# Patient Record
Sex: MALE | Race: WHITE | NOT HISPANIC OR LATINO | Employment: UNEMPLOYED | URBAN - NONMETROPOLITAN AREA
[De-identification: names, ages, dates, MRNs, and addresses within clinical notes are randomized per-mention and may not be internally consistent; named-entity substitution may affect disease eponyms.]

---

## 2017-01-06 ENCOUNTER — OFFICE VISIT (OUTPATIENT)
Dept: MEDICAL GROUP | Facility: CLINIC | Age: 47
End: 2017-01-06
Payer: OTHER MISCELLANEOUS

## 2017-01-06 VITALS
SYSTOLIC BLOOD PRESSURE: 110 MMHG | TEMPERATURE: 99.3 F | OXYGEN SATURATION: 98 % | WEIGHT: 197 LBS | HEART RATE: 78 BPM | DIASTOLIC BLOOD PRESSURE: 70 MMHG | RESPIRATION RATE: 16 BRPM | BODY MASS INDEX: 25.28 KG/M2 | HEIGHT: 74 IN

## 2017-01-06 DIAGNOSIS — G89.29 CHRONIC NONINTRACTABLE HEADACHE, UNSPECIFIED HEADACHE TYPE: ICD-10-CM

## 2017-01-06 DIAGNOSIS — R51.9 CHRONIC NONINTRACTABLE HEADACHE, UNSPECIFIED HEADACHE TYPE: ICD-10-CM

## 2017-01-06 DIAGNOSIS — M54.50 MIDLINE LOW BACK PAIN WITHOUT SCIATICA, UNSPECIFIED CHRONICITY: ICD-10-CM

## 2017-01-06 PROCEDURE — 99214 OFFICE O/P EST MOD 30 MIN: CPT | Performed by: NURSE PRACTITIONER

## 2017-01-06 RX ORDER — HYDROCODONE BITARTRATE AND ACETAMINOPHEN 5; 325 MG/1; MG/1
1 TABLET ORAL 2 TIMES DAILY PRN
Qty: 60 TAB | Refills: 0 | Status: SHIPPED | OUTPATIENT
Start: 2017-01-06 | End: 2017-04-05 | Stop reason: SDUPTHER

## 2017-01-06 RX ORDER — HYDROCODONE BITARTRATE AND ACETAMINOPHEN 5; 325 MG/1; MG/1
1 TABLET ORAL 2 TIMES DAILY PRN
Qty: 60 TAB | Refills: 0 | Status: SHIPPED
Start: 2017-01-06 | End: 2017-01-06 | Stop reason: SDUPTHER

## 2017-01-06 RX ORDER — HYDROCODONE BITARTRATE AND ACETAMINOPHEN 5; 325 MG/1; MG/1
1 TABLET ORAL 2 TIMES DAILY PRN
Qty: 60 TAB | Refills: 0 | Status: SHIPPED | OUTPATIENT
Start: 2017-01-06 | End: 2017-04-05

## 2017-01-06 NOTE — MR AVS SNAPSHOT
"        Arcelia Umanzor   2017 4:00 PM   Office Visit   MRN: [id number]    Department:  [facility] Phone:  [phone]    Description:  Male : 1970   Provider:  FRANCO Henley           Reason for Visit     Medication Refill           Allergies as of 2017     No Known Allergies      You were diagnosed with     Chronic nonintractable headache, unspecified headache type   [5851829]       Midline low back pain without sciatica, unspecified chronicity   [[id number]]       Lower abdominal pain   [244777]   Medication refilled.      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   2015 - Present    Dyspepsia and disorder of function of stomach K31.9, R10.13   2015 - Present    Anxiety F41.9   2015 - Present    Right-sided low back pain without sciatica M54.5   2016 - Present    Anal fissure K60.2   3/28/2016 - Present    IBS (irritable bowel syndrome) K58.9   6/10/2016 - Present    Tension type headache G44.209   2016 - Present    Rectal pain K62.89   2016 - Present    Midline low back pain M54.5   2016 - Present    Chronic nonintractable headache R51   2017 - Present      Health Maintenance        Date Due Completion Dates    IMM INFLUENZA (1) 2016 10/13/2015, 10/15/2014, 10/24/2013    IMM DTaP/Tdap/Td Vaccine (2 - Td) 10/27/2023 10/27/2013, 2011            Current Immunizations     Hep A/HEP B Combined Vaccine (TwinRix) 10/31/2011    Influenza Vaccine Quad Inj (Pf) 10/24/2013    Influenza Vaccine Quad Inj (Preserved) " 10/13/2015, 10/15/2014    Tdap Vaccine 10/27/2013, 12/13/2011      Below and/or attached are the medications your provider expects you to take. Review all of your home medications and newly ordered medications with your provider and/or pharmacist. Follow medication instructions as directed by your provider and/or pharmacist. Please keep your medication list with you and share with your provider. Update the information when medications are discontinued, doses are changed, or new medications (including over-the-counter products) are added; and carry medication information at all times in the event of emergency situations     Allergies:  No Known Allergies          Medications  Valid as of: January 06, 2017 -  4:28 PM    Generic Name Brand Name Tablet Size Instructions for use    ALPRAZolam (Tab) XANAX 0.5 MG Take 1 Tab by mouth at bedtime as needed for Sleep.        Amitriptyline HCl   Take  by mouth.        Hydrocodone-Acetaminophen (Tab) NORCO 5-325 MG Take 1 Tab by mouth 2 times a day as needed.        Hydrocodone-Acetaminophen (Tab) NORCO 5-325 MG Take 1 Tab by mouth 2 times a day as needed.        Sucralfate (Tab) CARAFATE 1 GM Take 1 Tab by mouth 4 Times a Day,Before Meals and at Bedtime.        Venlafaxine HCl (CAPSULE SR 24 HR) EFFEXOR XR 37.5 MG Take 1 Cap by mouth every day.        .                 Medicines prescribed today were sent to:     [facility] [address] [place] [id number]    Phone: [phone] Fax: [phone]    Open 24 Hours?: No      Medication refill instructions:       If your prescription bottle indicates you have medication refills left, it is not necessary to call your provider’s office. Please contact your pharmacy and they will refill your medication.    If your prescription bottle indicates you do not have any refills left, you may request refills at any time through one of the following ways: The online [employer]  system (except Urgent Care), by calling your provider’s office, or by asking your pharmacy to contact your provider’s office with a refill request. Medication refills are processed only during regular business hours and may not be available until the next business day. Your provider may request additional information or to have a follow-up visit with you prior to refilling your medication.   *Please Note: Medication refills are assigned a new Rx number when refilled electronically. Your pharmacy may indicate that no refills were authorized even though a new prescription for the same medication is available at the pharmacy. Please request the medicine by name with the pharmacy before contacting your provider for a refill.        Referral     A referral request has been sent to our patient care coordination department. Please allow 3-5 business days for us to process this request and contact you either by phone or mail. If you do not hear from us by the 5th business day, please call us at [phone].           [employer] (Closed) Access Code: Activation code not generated  Current [employer] (Closed) Status: Active

## 2017-01-07 NOTE — ASSESSMENT & PLAN NOTE
Daily headaches  Often accompanied by back and neck pain  OTC medication doesn't work  Never used migraine meds
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
## 2017-04-05 ENCOUNTER — OFFICE VISIT (OUTPATIENT)
Dept: MEDICAL GROUP | Facility: CLINIC | Age: 47
End: 2017-04-05
Payer: OTHER MISCELLANEOUS

## 2017-04-05 VITALS
HEART RATE: 96 BPM | SYSTOLIC BLOOD PRESSURE: 144 MMHG | BODY MASS INDEX: 24.9 KG/M2 | DIASTOLIC BLOOD PRESSURE: 90 MMHG | HEIGHT: 74 IN | WEIGHT: 194 LBS | OXYGEN SATURATION: 97 % | TEMPERATURE: 99.1 F | RESPIRATION RATE: 16 BRPM

## 2017-04-05 DIAGNOSIS — Z13.6 SCREENING FOR CARDIOVASCULAR CONDITION: ICD-10-CM

## 2017-04-05 DIAGNOSIS — M54.50 MIDLINE LOW BACK PAIN WITHOUT SCIATICA, UNSPECIFIED CHRONICITY: ICD-10-CM

## 2017-04-05 DIAGNOSIS — M54.50 CHRONIC RIGHT-SIDED LOW BACK PAIN WITHOUT SCIATICA: ICD-10-CM

## 2017-04-05 DIAGNOSIS — F41.9 ANXIETY: ICD-10-CM

## 2017-04-05 DIAGNOSIS — G89.29 CHRONIC RIGHT-SIDED LOW BACK PAIN WITHOUT SCIATICA: ICD-10-CM

## 2017-04-05 PROCEDURE — 99213 OFFICE O/P EST LOW 20 MIN: CPT | Performed by: PHYSICIAN ASSISTANT

## 2017-04-05 RX ORDER — HYDROCODONE BITARTRATE AND ACETAMINOPHEN 5; 325 MG/1; MG/1
1 TABLET ORAL
Qty: 30 TAB | Refills: 0 | Status: SHIPPED | OUTPATIENT
Start: 2017-04-05 | End: 2017-05-04 | Stop reason: SDUPTHER

## 2017-04-05 RX ORDER — BUSPIRONE HYDROCHLORIDE 10 MG/1
10 TABLET ORAL 2 TIMES DAILY
Qty: 90 TAB | Refills: 0 | Status: SHIPPED | OUTPATIENT
Start: 2017-04-05 | End: 2018-11-08

## 2017-04-05 ASSESSMENT — PATIENT HEALTH QUESTIONNAIRE - PHQ9: CLINICAL INTERPRETATION OF PHQ2 SCORE: 0

## 2017-04-05 ASSESSMENT — PAIN SCALES - GENERAL: PAINLEVEL: 6=MODERATE PAIN
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
## 2017-04-05 NOTE — MR AVS SNAPSHOT
"        Arcelia Umanzor   2017 1:00 PM   Office Visit   MRN: [id number]    [facility] Phone:  [phone]    Description:  Male : 1970   Provider:  Almaz Richmond PA-C           Reason for Visit     Hand Pain left thumb injury x1 month     Medication Refill pain medication, anxiety       Allergies as of 2017     No Known Allergies      You were diagnosed with     Midline low back pain without sciatica, unspecified chronicity   [8049847]   Uncontrolled. Referral initiated to physical therapy and pain management. Use pain medication as directed and as needed for symptoms. Follow-up in 3 months.    Chronic right-sided low back pain without sciatica   [1548892]       Anxiety   [845930]       Screening for cardiovascular condition   [829523]         Vital Signs     Blood Pressure Pulse Temperature Respirations Height Weight    144/90 mmHg 96 37.3 °C (99.1 °F) 16 1.88 m (6' 2.02\") 87.998 kg (194 lb)    Body Mass Index Oxygen Saturation Smoking Status             24.90 kg/m2 97% Never Smoker          Basic Information     Date Of Birth Sex Race Ethnicity Preferred Language    1970 Male White Non- English      Your appointments     May 08, 2017  8:00 AM   Established Patient with Almaz Richmond PA-C   [facility] (--)    [address] [place] [address] [id number] [phone]           You will be receiving a confirmation call a few days before your appointment from our automated call confirmation system.              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   2015 - Present    Dyspepsia and disorder of function of stomach K31.9, R10.13   2015 - Present    Anxiety F41.9   2015 - Present    Right-sided low back pain without sciatica M54.5   2016 - Present    Anal fissure K60.2   3/28/2016 - Present    IBS (irritable bowel syndrome) K58.9   6/10/2016 - Present   " Tension type headache G44.209   7/12/2016 - Present    Rectal pain K62.89   7/12/2016 - Present    Midline low back pain M54.5   7/12/2016 - Present    Chronic nonintractable headache R51   1/6/2017 - Present      Health Maintenance        Date Due Completion Dates    IMM DTaP/Tdap/Td Vaccine (2 - Td) 10/27/2023 10/27/2013, 12/13/2011            Current Immunizations     Hep A/HEP B Combined Vaccine (TwinRix) 10/31/2011    Influenza Vaccine Quad Inj (Pf) 10/24/2013    Influenza Vaccine Quad Inj (Preserved) 10/13/2015, 10/15/2014    Tdap Vaccine 10/27/2013, 12/13/2011      Below and/or attached are the medications your provider expects you to take. Review all of your home medications and newly ordered medications with your provider and/or pharmacist. Follow medication instructions as directed by your provider and/or pharmacist. Please keep your medication list with you and share with your provider. Update the information when medications are discontinued, doses are changed, or new medications (including over-the-counter products) are added; and carry medication information at all times in the event of emergency situations     Allergies:  No Known Allergies          Medications  Valid as of: April 05, 2017 -  1:50 PM    Generic Name Brand Name Tablet Size Instructions for use    BusPIRone HCl (Tab) BUSPAR 10 MG Take 1 Tab by mouth 2 times a day.        Hydrocodone-Acetaminophen (Tab) NORCO 5-325 MG Take 1 Tab by mouth 1 time daily as needed.        .                 Medicines prescribed today were sent to:     [facility] [address] [place] [address] [address] [facility]    [address] [facility] [address]    Phone: [phone] Fax: [phone]    Open 24 Hours?: No      Medication refill instructions:       If your prescription bottle indicates you have medication refills left, it is not necessary to call your provider’s office. Please contact your pharmacy and they will refill your medication.    If  your prescription bottle indicates you do not have any refills left, you may request refills at any time through one of the following ways: The online [facility] system (except Urgent Care), by calling your provider’s office, or by asking your pharmacy to contact your provider’s office with a refill request. Medication refills are processed only during regular business hours and may not be available until the next business day. Your provider may request additional information or to have a follow-up visit with you prior to refilling your medication.   *Please Note: Medication refills are assigned a new Rx number when refilled electronically. Your pharmacy may indicate that no refills were authorized even though a new prescription for the same medication is available at the pharmacy. Please request the medicine by name with the pharmacy before contacting your provider for a refill.        Your To Do List     Future Labs/Procedures Complete By Expires    CBC WITHOUT DIFFERENTIAL  As directed 10/6/2017    [facility] PAIN MANAGEMENT SCREEN  As directed 4/5/2018    Comments:    Current Meds (name, sig, last dose):   Current outpatient prescriptions:   •  hydrocodone-acetaminophen, 1 Tab, Oral, BID PRN  •  alprazolam, 0.5 mg, Oral, HS PRN  •  hydrocodone-acetaminophen, 1 Tab, Oral, BID PRN  •  venlafaxine XR, 37.5 mg, Oral, DAILY (Patient not taking: Reported on 4/5/2017), not taking  •  sucralfate, 1 g, Oral, 4X/DAY ACHS (Patient not taking: Reported on 1/6/2017), not taking            Referral     A referral request has been sent to our patient care coordination department. Please allow 3-5 business days for us to process this request and contact you either by phone or mail. If you do not hear from us by the 5th business day, please call us at [phone].           [facility] Access Code: Activation code not generated  Current [facility] Status: Active          Quit Tobacco Information     Do you want to quit using  tobacco?    Quitting tobacco decreases risks of cancer, heart and lung disease, increases life expectancy, improves sense of taste and smell, and increases spending money, among other benefits.    If you are thinking about quitting, we can help.  • [facility] Quit Tobacco Program: [phone]  o Program occurs weekly for four weeks and includes pharmacist consultation on products to support quitting smoking or chewing tobacco. A provider referral is needed for pharmacist consultation.  • Tobacco Users Help Hotline: 3-146-QUIT-NOW (718-9257) or https://[place].quitlogix.org/  o Free, confidential telephone and online coaching for [place] residents. Sessions are designed on a schedule that is convenient for you. Eligible clients receive free nicotine replacement therapy.  • Nationally: www.smokefree.gov  o Information and professional assistance to support both immediate and long-term needs as you become, and remain, a non-smoker. Smokefree.gov allows you to choose the help that best fits your needs.

## 2017-04-05 NOTE — ASSESSMENT & PLAN NOTE
"Pt has anxiety that occurs with extremes every couple of weeks and at a low level on a daily basis. He did not like the side effects amitriptyline so he would like to try a different medication. Pt denies suicidal or homocidal ideations. Patient feels often he wishes to leave his workplace due to anxiety. He feels \"everyone gets on his nerves\". He will try daily BuSpar 10 mg tablets for the next month and return back to the clinic for checkup.  "

## 2017-05-04 DIAGNOSIS — M54.50 MIDLINE LOW BACK PAIN WITHOUT SCIATICA, UNSPECIFIED CHRONICITY: ICD-10-CM

## 2017-05-04 RX ORDER — HYDROCODONE BITARTRATE AND ACETAMINOPHEN 5; 325 MG/1; MG/1
0.5 TABLET ORAL
Qty: 30 TAB | Refills: 0 | Status: SHIPPED | OUTPATIENT
Start: 2017-05-04 | End: 2017-05-31 | Stop reason: SDUPTHER

## 2017-05-31 ENCOUNTER — OFFICE VISIT (OUTPATIENT)
Dept: MEDICAL GROUP | Facility: CLINIC | Age: 47
End: 2017-05-31
Payer: OTHER MISCELLANEOUS

## 2017-05-31 VITALS
BODY MASS INDEX: 24.38 KG/M2 | OXYGEN SATURATION: 97 % | WEIGHT: 190 LBS | TEMPERATURE: 98.7 F | HEIGHT: 74 IN | HEART RATE: 96 BPM | SYSTOLIC BLOOD PRESSURE: 130 MMHG | DIASTOLIC BLOOD PRESSURE: 74 MMHG

## 2017-05-31 DIAGNOSIS — M54.50 MIDLINE LOW BACK PAIN WITHOUT SCIATICA, UNSPECIFIED CHRONICITY: ICD-10-CM

## 2017-05-31 DIAGNOSIS — M54.50 CHRONIC RIGHT-SIDED LOW BACK PAIN WITHOUT SCIATICA: ICD-10-CM

## 2017-05-31 DIAGNOSIS — R68.89 FLU-LIKE SYMPTOMS: ICD-10-CM

## 2017-05-31 DIAGNOSIS — G89.29 CHRONIC RIGHT-SIDED LOW BACK PAIN WITHOUT SCIATICA: ICD-10-CM

## 2017-05-31 PROCEDURE — 99213 OFFICE O/P EST LOW 20 MIN: CPT | Performed by: PHYSICIAN ASSISTANT

## 2017-05-31 RX ORDER — OSELTAMIVIR PHOSPHATE 45 MG/1
45 CAPSULE ORAL EVERY 12 HOURS
Qty: 10 CAP | Refills: 0 | Status: SHIPPED | OUTPATIENT
Start: 2017-05-31 | End: 2018-11-08

## 2017-05-31 RX ORDER — HYDROCODONE BITARTRATE AND ACETAMINOPHEN 5; 325 MG/1; MG/1
1 TABLET ORAL
Qty: 13 TAB | Refills: 0 | Status: SHIPPED
Start: 2017-05-31 | End: 2018-11-08

## 2017-05-31 NOTE — Clinical Note
May 31, 2017       Patient: Arcelia Umanzor   YOB: 1970   Date of Visit: 5/31/2017         To Whom It May Concern:    It is my medical opinion that Arcelia Umanzor remain out of work until he is feeling better, likely no later than June 7th, 2017 .    If you have any questions or concerns, please don't hesitate to call [phone]          Sincerely,          Almaz Richmond PA-C  Electronically Signed
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
## 2017-05-31 NOTE — ASSESSMENT & PLAN NOTE
"PAtient has been having body aches and chills for approx 36 hours. He states he began feeling some lightheadedness last evening and this morning awoke with a splitting headache. He has had meningitis in the past but this does not feel the same. He is not having any changes inhis urine color and it is not cloudy although it does smell somewhat \"like wheaties\" today. He is having some mid-back pain without diarrhea or vomiting but with some nausea. He called in sick to work today and rarely does this as he is generally able to tough it out.   "

## 2018-01-22 NOTE — ASSESSMENT & PLAN NOTE
Patient has an appoitnment with [facility] for pain management on 6/16/17. He has run out of his Norco somewhat early as he sometimes took two per day instead of one per day as reccommended. He is currently having a bout of the flu but is requesting a bridge of his norco to help him with his back pain until he is seen by pain management.    33.7

## 2018-11-05 ENCOUNTER — NON-PROVIDER VISIT (OUTPATIENT)
Dept: URGENT CARE | Facility: PHYSICIAN GROUP | Age: 48
End: 2018-11-05

## 2018-11-05 DIAGNOSIS — Z02.1 PRE-EMPLOYMENT DRUG SCREENING: ICD-10-CM

## 2018-11-05 LAB
AMP AMPHETAMINE: NORMAL
BAR BARBITURATES: NORMAL
BZO BENZODIAZEPINES: NORMAL
COC COCAINE: NORMAL
INT CON NEG: NORMAL
INT CON POS: NORMAL
MDMA ECSTASY: NORMAL
MET METHAMPHETAMINES: NORMAL
MTD METHADONE: NORMAL
OPI OPIATES: NORMAL
OXY OXYCODONE: NORMAL
PCP PHENCYCLIDINE: NORMAL
POC URINE DRUG SCREEN OCDRS: NORMAL
THC: NORMAL

## 2018-11-05 PROCEDURE — 80305 DRUG TEST PRSMV DIR OPT OBS: CPT | Performed by: PHYSICIAN ASSISTANT

## 2018-11-08 ENCOUNTER — TELEPHONE (OUTPATIENT)
Dept: URGENT CARE | Facility: PHYSICIAN GROUP | Age: 48
End: 2018-11-08

## 2018-11-08 ENCOUNTER — APPOINTMENT (OUTPATIENT)
Dept: RADIOLOGY | Facility: IMAGING CENTER | Age: 48
End: 2018-11-08
Attending: PHYSICIAN ASSISTANT
Payer: MEDICAID

## 2018-11-08 ENCOUNTER — OCCUPATIONAL MEDICINE (OUTPATIENT)
Dept: URGENT CARE | Facility: PHYSICIAN GROUP | Age: 48
End: 2018-11-08
Payer: MEDICAID

## 2018-11-08 VITALS
SYSTOLIC BLOOD PRESSURE: 142 MMHG | BODY MASS INDEX: 23.74 KG/M2 | TEMPERATURE: 97 F | DIASTOLIC BLOOD PRESSURE: 90 MMHG | HEIGHT: 74 IN | RESPIRATION RATE: 18 BRPM | WEIGHT: 185 LBS | OXYGEN SATURATION: 97 % | HEART RATE: 98 BPM

## 2018-11-08 DIAGNOSIS — Z02.89 ENCOUNTER FOR PHYSICAL EXAMINATION RELATED TO EMPLOYMENT: ICD-10-CM

## 2018-11-08 PROCEDURE — 71045 X-RAY EXAM CHEST 1 VIEW: CPT | Mod: FY | Performed by: PHYSICIAN ASSISTANT

## 2018-11-08 PROCEDURE — 8915 PR COMPREHENSIVE PHYSICAL: Performed by: PHYSICIAN ASSISTANT

## 2018-11-08 PROCEDURE — 93000 ELECTROCARDIOGRAM COMPLETE: CPT | Performed by: PHYSICIAN ASSISTANT

## 2018-11-08 PROCEDURE — 94010 BREATHING CAPACITY TEST: CPT | Performed by: PHYSICIAN ASSISTANT

## 2018-11-08 PROCEDURE — 92553 AUDIOMETRY AIR & BONE: CPT | Performed by: PHYSICIAN ASSISTANT

## 2018-11-08 ASSESSMENT — ENCOUNTER SYMPTOMS
NAUSEA: 0
CHILLS: 0
CONSTIPATION: 0
DIARRHEA: 0
SHORTNESS OF BREATH: 0
FEVER: 0
COUGH: 0
ABDOMINAL PAIN: 0
VOMITING: 0
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
## 2018-11-21 ENCOUNTER — OFFICE VISIT (OUTPATIENT)
Dept: URGENT CARE | Facility: PHYSICIAN GROUP | Age: 48
End: 2018-11-21
Payer: MEDICAID

## 2018-11-21 VITALS
HEIGHT: 74 IN | TEMPERATURE: 98.6 F | BODY MASS INDEX: 25.21 KG/M2 | WEIGHT: 196.4 LBS | HEART RATE: 100 BPM | RESPIRATION RATE: 16 BRPM | SYSTOLIC BLOOD PRESSURE: 118 MMHG | DIASTOLIC BLOOD PRESSURE: 66 MMHG | OXYGEN SATURATION: 98 %

## 2018-11-21 DIAGNOSIS — J20.8 ACUTE BACTERIAL BRONCHITIS: ICD-10-CM

## 2018-11-21 DIAGNOSIS — B96.89 ACUTE BACTERIAL BRONCHITIS: ICD-10-CM

## 2018-11-21 PROCEDURE — 99214 OFFICE O/P EST MOD 30 MIN: CPT | Performed by: FAMILY MEDICINE

## 2018-11-21 RX ORDER — PREDNISONE 20 MG/1
40 TABLET ORAL EVERY MORNING
Qty: 10 TAB | Refills: 0 | Status: SHIPPED | OUTPATIENT
Start: 2018-11-21 | End: 2018-11-26

## 2018-11-21 RX ORDER — ALBUTEROL SULFATE 90 UG/1
2 AEROSOL, METERED RESPIRATORY (INHALATION) EVERY 6 HOURS PRN
Qty: 8.5 G | Refills: 3 | Status: SHIPPED | OUTPATIENT
Start: 2018-11-21 | End: 2019-01-19

## 2018-11-21 RX ORDER — DOXYCYCLINE HYCLATE 100 MG
100 TABLET ORAL EVERY 12 HOURS
Qty: 14 TAB | Refills: 0 | Status: SHIPPED | OUTPATIENT
Start: 2018-11-21 | End: 2018-11-28

## 2018-11-21 RX ORDER — PROMETHAZINE HYDROCHLORIDE AND CODEINE PHOSPHATE 6.25; 1 MG/5ML; MG/5ML
5 SYRUP ORAL 4 TIMES DAILY PRN
Qty: 140 ML | Refills: 0 | Status: SHIPPED | OUTPATIENT
Start: 2018-11-21 | End: 2018-11-28

## 2018-11-21 NOTE — PROGRESS NOTES
"Subjective:      Arcelia Umanzor is a 48 y.o. male who presents with Nasal Congestion (x 2 weeks ); Cough; Wheezing; and Fever      - This is a very pleasant, well and non-toxic appearing 48 y.o. male with complaints of 3 wks w/ cough wheezing. Remote smoker. Some inus and sore throat, but better now. No NVFC          ALLERGIES:  Patient has no known allergies.     PMH:  Past Medical History:   Diagnosis Date   • Anxiety    • Blood transfusion, without reported diagnosis    • Headache(784.0)    • Headache, classical migraine    • IBS (irritable bowel syndrome) 6/10/2016   • Meningitis 2010   • Substance abuse ([facility])         MEDS:    Current Outpatient Prescriptions:   •  Multiple Vitamins-Minerals (MULTI ADULT GUMMIES PO), Take  by mouth., Disp: , Rfl:   •  predniSONE (DELTASONE) 20 MG Tab, Take 2 Tabs by mouth every morning for 5 days., Disp: 10 Tab, Rfl: 0  •  albuterol (PROAIR HFA) 108 (90 Base) MCG/ACT Aero Soln inhalation aerosol, Inhale 2 Puffs by mouth every 6 hours as needed for Shortness of Breath., Disp: 8.5 g, Rfl: 3  •  promethazine-codeine (PHENERGAN-CODEINE) 6.25-10 MG/5ML Syrup, Take 5 mL by mouth 4 times a day as needed for Cough for up to 7 days., Disp: 140 mL, Rfl: 0  •  doxycycline (VIBRAMYCIN) 100 MG Tab, Take 1 Tab by mouth every 12 hours for 7 days., Disp: 14 Tab, Rfl: 0    ** I have documented what I find to be significant in regards to past medical, social, family and surgical history  in my HPI or under PMH/PSH/FH review section, otherwise it is contributory **           HPI    Review of Systems   All other systems reviewed and are negative.         Objective:     /66   Pulse 100   Temp 37 °C (98.6 °F)   Resp 16   Ht 1.88 m (6' 2\")   Wt 89.1 kg (196 lb 6.4 oz)   SpO2 98%   BMI 25.22 kg/m²      Physical Exam   Constitutional: He appears well-developed. No distress.   HENT:   Head: Normocephalic and atraumatic.   Mouth/Throat: Oropharynx is clear and moist.   Eyes: Conjunctivae are " normal.   Neck: Neck supple.   Cardiovascular: Regular rhythm.    No murmur heard.  Pulmonary/Chest: Effort normal and breath sounds normal. No respiratory distress.   Neurological: He is alert. He exhibits normal muscle tone.   Skin: Skin is warm and dry.   Psychiatric: He has a normal mood and affect. Judgment normal.   Nursing note and vitals reviewed.              Assessment/Plan:         1. Acute bacterial bronchitis  predniSONE (DELTASONE) 20 MG Tab    albuterol (PROAIR HFA) 108 (90 Base) MCG/ACT Aero Soln inhalation aerosol    promethazine-codeine (PHENERGAN-CODEINE) 6.25-10 MG/5ML Syrup    doxycycline (VIBRAMYCIN) 100 MG Tab             Dx & d/c instructions discussed w/ patient and/or family members.     ER precautions (worsening signs symptoms and when to go to ER) discussed.    Follow up w/ PCP in 2-3 days to make sure symptoms improving and no further intervention/treatment and/or work-up needed was advised, ER if feeling worse or not improving in 2 days.    Possible side effects (i.e. Rash, GI upset/constipation, sedation, elevation of BP or sugars) of any medications given discussed.     Patient left in stable condition

## 2019-01-19 ENCOUNTER — HOSPITAL ENCOUNTER (EMERGENCY)
Facility: MEDICAL CENTER | Age: 49
End: 2019-01-19
Attending: EMERGENCY MEDICINE
Payer: MEDICAID

## 2019-01-19 VITALS
HEART RATE: 109 BPM | RESPIRATION RATE: 14 BRPM | WEIGHT: 195.33 LBS | SYSTOLIC BLOOD PRESSURE: 149 MMHG | DIASTOLIC BLOOD PRESSURE: 112 MMHG | BODY MASS INDEX: 25.08 KG/M2 | OXYGEN SATURATION: 95 % | TEMPERATURE: 98.4 F

## 2019-01-19 DIAGNOSIS — J02.9 PHARYNGITIS, UNSPECIFIED ETIOLOGY: ICD-10-CM

## 2019-01-19 DIAGNOSIS — R05.9 COUGH: ICD-10-CM

## 2019-01-19 PROCEDURE — 99283 EMERGENCY DEPT VISIT LOW MDM: CPT

## 2019-01-19 RX ORDER — BENZONATATE 100 MG/1
100 CAPSULE ORAL 3 TIMES DAILY PRN
Qty: 15 CAP | Refills: 0 | Status: SHIPPED | OUTPATIENT
Start: 2019-01-19 | End: 2019-02-01

## 2019-01-19 RX ORDER — AMOXICILLIN AND CLAVULANATE POTASSIUM 875; 125 MG/1; MG/1
1 TABLET, FILM COATED ORAL 2 TIMES DAILY
Qty: 20 TAB | Refills: 0 | Status: SHIPPED | OUTPATIENT
Start: 2019-01-19 | End: 2019-01-29

## 2019-01-19 ASSESSMENT — LIFESTYLE VARIABLES: DO YOU DRINK ALCOHOL: NO
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
## 2019-01-19 NOTE — ED TRIAGE NOTES
Ambulates to triage  Chief Complaint   Patient presents with   • Cough   • Shortness of Breath   • Sore Throat     Sx for the last 4 days, 95% RA.   Cough is non productive.

## 2019-01-19 NOTE — ED NOTES
Pt ambulatory with nad and steady gait, resp even and non labored, pt verbalized understanding of poc an ddischarge

## 2019-02-01 ENCOUNTER — APPOINTMENT (OUTPATIENT)
Dept: RADIOLOGY | Facility: IMAGING CENTER | Age: 49
End: 2019-02-01
Attending: FAMILY MEDICINE
Payer: MEDICAID

## 2019-02-01 ENCOUNTER — OFFICE VISIT (OUTPATIENT)
Dept: URGENT CARE | Facility: PHYSICIAN GROUP | Age: 49
End: 2019-02-01
Payer: MEDICAID

## 2019-02-01 VITALS
DIASTOLIC BLOOD PRESSURE: 82 MMHG | RESPIRATION RATE: 18 BRPM | TEMPERATURE: 98.4 F | HEART RATE: 104 BPM | BODY MASS INDEX: 26.68 KG/M2 | OXYGEN SATURATION: 97 % | HEIGHT: 72 IN | SYSTOLIC BLOOD PRESSURE: 128 MMHG | WEIGHT: 197 LBS

## 2019-02-01 DIAGNOSIS — S99.912A INJURY OF LEFT ANKLE, INITIAL ENCOUNTER: ICD-10-CM

## 2019-02-01 DIAGNOSIS — R51.9 NONINTRACTABLE HEADACHE, UNSPECIFIED CHRONICITY PATTERN, UNSPECIFIED HEADACHE TYPE: ICD-10-CM

## 2019-02-01 DIAGNOSIS — S20.212A CONTUSION OF RIB ON LEFT SIDE, INITIAL ENCOUNTER: ICD-10-CM

## 2019-02-01 PROCEDURE — 73610 X-RAY EXAM OF ANKLE: CPT | Mod: LT | Performed by: FAMILY MEDICINE

## 2019-02-01 PROCEDURE — 99214 OFFICE O/P EST MOD 30 MIN: CPT | Performed by: FAMILY MEDICINE

## 2019-02-01 PROCEDURE — 71101 X-RAY EXAM UNILAT RIBS/CHEST: CPT | Mod: LT | Performed by: FAMILY MEDICINE

## 2019-02-01 RX ORDER — IBUPROFEN 200 MG
600 TABLET ORAL ONCE
Status: COMPLETED | OUTPATIENT
Start: 2019-02-01 | End: 2019-02-01

## 2019-02-01 RX ADMIN — Medication 600 MG: at 18:55
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
## 2019-05-18 ENCOUNTER — OFFICE VISIT (OUTPATIENT)
Dept: URGENT CARE | Facility: PHYSICIAN GROUP | Age: 49
End: 2019-05-18
Payer: MEDICAID

## 2019-05-18 VITALS
WEIGHT: 189 LBS | OXYGEN SATURATION: 98 % | TEMPERATURE: 98.2 F | SYSTOLIC BLOOD PRESSURE: 110 MMHG | HEART RATE: 89 BPM | DIASTOLIC BLOOD PRESSURE: 80 MMHG | HEIGHT: 74 IN | BODY MASS INDEX: 24.26 KG/M2 | RESPIRATION RATE: 16 BRPM

## 2019-05-18 DIAGNOSIS — M25.461 PAIN AND SWELLING OF RIGHT KNEE: ICD-10-CM

## 2019-05-18 DIAGNOSIS — M25.561 PAIN AND SWELLING OF RIGHT KNEE: ICD-10-CM

## 2019-05-18 PROCEDURE — 99214 OFFICE O/P EST MOD 30 MIN: CPT | Performed by: FAMILY MEDICINE

## 2019-05-18 RX ORDER — PREDNISONE 20 MG/1
TABLET ORAL
Qty: 9 TAB | Refills: 0 | Status: SHIPPED | OUTPATIENT
Start: 2019-05-18 | End: 2020-09-23

## 2019-05-18 RX ORDER — KETOROLAC TROMETHAMINE 30 MG/ML
60 INJECTION, SOLUTION INTRAMUSCULAR; INTRAVENOUS ONCE
Status: COMPLETED | OUTPATIENT
Start: 2019-05-18 | End: 2019-05-18

## 2019-05-18 RX ADMIN — KETOROLAC TROMETHAMINE 60 MG: 30 INJECTION, SOLUTION INTRAMUSCULAR; INTRAVENOUS at 12:07
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
## 2020-02-10 ENCOUNTER — OCCUPATIONAL MEDICINE (OUTPATIENT)
Dept: URGENT CARE | Facility: PHYSICIAN GROUP | Age: 50
End: 2020-02-10

## 2020-02-10 VITALS
HEART RATE: 73 BPM | OXYGEN SATURATION: 98 % | HEIGHT: 74 IN | SYSTOLIC BLOOD PRESSURE: 120 MMHG | BODY MASS INDEX: 26.31 KG/M2 | WEIGHT: 205 LBS | DIASTOLIC BLOOD PRESSURE: 70 MMHG | TEMPERATURE: 98 F | RESPIRATION RATE: 20 BRPM

## 2020-02-10 DIAGNOSIS — Z02.1 PRE-EMPLOYMENT DRUG SCREENING: ICD-10-CM

## 2020-02-10 DIAGNOSIS — Z02.89 EXAMINATION, PHYSICAL, EMPLOYEE: ICD-10-CM

## 2020-02-10 LAB
AMP AMPHETAMINE: NORMAL
COC COCAINE: NORMAL
INT CON NEG: NORMAL
INT CON POS: NORMAL
MET METHAMPHETAMINES: NORMAL
OPI OPIATES: NORMAL
PCP PHENCYCLIDINE: NORMAL
POC DRUG COMMENT 753798-OCCUPATIONAL HEALTH: NEGATIVE
THC: NORMAL

## 2020-02-10 PROCEDURE — 8915 PR COMPREHENSIVE PHYSICAL: Performed by: PHYSICIAN ASSISTANT

## 2020-02-10 PROCEDURE — 80305 DRUG TEST PRSMV DIR OPT OBS: CPT | Performed by: PHYSICIAN ASSISTANT

## 2020-09-03 ENCOUNTER — TELEPHONE (OUTPATIENT)
Dept: SCHEDULING | Facility: IMAGING CENTER | Age: 50
End: 2020-09-03

## 2020-09-23 ENCOUNTER — OFFICE VISIT (OUTPATIENT)
Dept: MEDICAL GROUP | Facility: CLINIC | Age: 50
End: 2020-09-23
Payer: COMMERCIAL

## 2020-09-23 VITALS
RESPIRATION RATE: 16 BRPM | TEMPERATURE: 98.3 F | WEIGHT: 203 LBS | SYSTOLIC BLOOD PRESSURE: 118 MMHG | HEIGHT: 74 IN | OXYGEN SATURATION: 100 % | HEART RATE: 76 BPM | BODY MASS INDEX: 26.05 KG/M2 | DIASTOLIC BLOOD PRESSURE: 82 MMHG

## 2020-09-23 DIAGNOSIS — M54.50 CHRONIC RIGHT-SIDED LOW BACK PAIN WITHOUT SCIATICA: ICD-10-CM

## 2020-09-23 DIAGNOSIS — K76.0 FATTY LIVER: ICD-10-CM

## 2020-09-23 DIAGNOSIS — K60.2 ANAL FISSURE: ICD-10-CM

## 2020-09-23 DIAGNOSIS — G89.29 CHRONIC RIGHT-SIDED LOW BACK PAIN WITHOUT SCIATICA: ICD-10-CM

## 2020-09-23 DIAGNOSIS — I49.9 IRREGULAR HEART BEAT: ICD-10-CM

## 2020-09-23 DIAGNOSIS — K27.9 PEPTIC ULCER: ICD-10-CM

## 2020-09-23 DIAGNOSIS — F17.220 CHEWING TOBACCO NICOTINE DEPENDENCE WITHOUT COMPLICATION: ICD-10-CM

## 2020-09-23 DIAGNOSIS — F41.9 ANXIETY: ICD-10-CM

## 2020-09-23 DIAGNOSIS — F32.0 CURRENT MILD EPISODE OF MAJOR DEPRESSIVE DISORDER WITHOUT PRIOR EPISODE (HCC): ICD-10-CM

## 2020-09-23 DIAGNOSIS — K58.2 IRRITABLE BOWEL SYNDROME WITH BOTH CONSTIPATION AND DIARRHEA: ICD-10-CM

## 2020-09-23 DIAGNOSIS — M79.5 RETAINED BULLET: ICD-10-CM

## 2020-09-23 DIAGNOSIS — M54.50 MIDLINE LOW BACK PAIN WITHOUT SCIATICA, UNSPECIFIED CHRONICITY: ICD-10-CM

## 2020-09-23 PROCEDURE — 99214 OFFICE O/P EST MOD 30 MIN: CPT | Performed by: PHYSICIAN ASSISTANT

## 2020-09-23 RX ORDER — PANTOPRAZOLE SODIUM 40 MG/1
TABLET, DELAYED RELEASE ORAL
Qty: 90 TAB | Refills: 2 | Status: SHIPPED | OUTPATIENT
Start: 2020-09-23 | End: 2020-11-17

## 2020-09-23 RX ORDER — PANTOPRAZOLE SODIUM 40 MG/1
TABLET, DELAYED RELEASE ORAL
COMMUNITY
Start: 2020-09-16 | End: 2020-09-23 | Stop reason: SDUPTHER

## 2020-09-23 RX ORDER — BUPROPION HYDROCHLORIDE 100 MG/1
100 TABLET, EXTENDED RELEASE ORAL 2 TIMES DAILY
Qty: 60 TAB | Refills: 3 | Status: SHIPPED | OUTPATIENT
Start: 2020-09-23 | End: 2020-11-17
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
## 2020-10-31 ENCOUNTER — HOSPITAL ENCOUNTER (OUTPATIENT)
Dept: LAB | Facility: MEDICAL CENTER | Age: 50
End: 2020-10-31
Attending: PHYSICIAN ASSISTANT
Payer: COMMERCIAL

## 2020-10-31 DIAGNOSIS — K58.2 IRRITABLE BOWEL SYNDROME WITH BOTH CONSTIPATION AND DIARRHEA: ICD-10-CM

## 2020-10-31 DIAGNOSIS — K27.9 PEPTIC ULCER: ICD-10-CM

## 2020-10-31 LAB
ALBUMIN SERPL BCP-MCNC: 4.6 G/DL (ref 3.2–4.9)
ALBUMIN/GLOB SERPL: 2.1 G/DL
ALP SERPL-CCNC: 77 U/L (ref 30–99)
ALT SERPL-CCNC: 19 U/L (ref 2–50)
ANION GAP SERPL CALC-SCNC: 12 MMOL/L (ref 7–16)
AST SERPL-CCNC: 20 U/L (ref 12–45)
BASOPHILS # BLD AUTO: 1.1 % (ref 0–1.8)
BASOPHILS # BLD: 0.07 K/UL (ref 0–0.12)
BILIRUB SERPL-MCNC: 0.5 MG/DL (ref 0.1–1.5)
BUN SERPL-MCNC: 21 MG/DL (ref 8–22)
CALCIUM SERPL-MCNC: 9.5 MG/DL (ref 8.5–10.5)
CHLORIDE SERPL-SCNC: 103 MMOL/L (ref 96–112)
CO2 SERPL-SCNC: 25 MMOL/L (ref 20–33)
CREAT SERPL-MCNC: 1.08 MG/DL (ref 0.5–1.4)
EOSINOPHIL # BLD AUTO: 0.09 K/UL (ref 0–0.51)
EOSINOPHIL NFR BLD: 1.5 % (ref 0–6.9)
ERYTHROCYTE [DISTWIDTH] IN BLOOD BY AUTOMATED COUNT: 40.3 FL (ref 35.9–50)
GLOBULIN SER CALC-MCNC: 2.2 G/DL (ref 1.9–3.5)
GLUCOSE SERPL-MCNC: 99 MG/DL (ref 65–99)
HCT VFR BLD AUTO: 44.8 % (ref 42–52)
HGB BLD-MCNC: 14.9 G/DL (ref 14–18)
IMM GRANULOCYTES # BLD AUTO: 0.01 K/UL (ref 0–0.11)
IMM GRANULOCYTES NFR BLD AUTO: 0.2 % (ref 0–0.9)
LYMPHOCYTES # BLD AUTO: 2.13 K/UL (ref 1–4.8)
LYMPHOCYTES NFR BLD: 34.6 % (ref 22–41)
MCH RBC QN AUTO: 30.3 PG (ref 27–33)
MCHC RBC AUTO-ENTMCNC: 33.3 G/DL (ref 33.7–35.3)
MCV RBC AUTO: 91.2 FL (ref 81.4–97.8)
MONOCYTES # BLD AUTO: 0.43 K/UL (ref 0–0.85)
MONOCYTES NFR BLD AUTO: 7 % (ref 0–13.4)
NEUTROPHILS # BLD AUTO: 3.43 K/UL (ref 1.82–7.42)
NEUTROPHILS NFR BLD: 55.6 % (ref 44–72)
NRBC # BLD AUTO: 0 K/UL
NRBC BLD-RTO: 0 /100 WBC
PLATELET # BLD AUTO: 261 K/UL (ref 164–446)
PMV BLD AUTO: 10.9 FL (ref 9–12.9)
POTASSIUM SERPL-SCNC: 4.7 MMOL/L (ref 3.6–5.5)
PROT SERPL-MCNC: 6.8 G/DL (ref 6–8.2)
RBC # BLD AUTO: 4.91 M/UL (ref 4.7–6.1)
SODIUM SERPL-SCNC: 140 MMOL/L (ref 135–145)
WBC # BLD AUTO: 6.2 K/UL (ref 4.8–10.8)

## 2020-10-31 PROCEDURE — 80053 COMPREHEN METABOLIC PANEL: CPT

## 2020-10-31 PROCEDURE — 85025 COMPLETE CBC W/AUTO DIFF WBC: CPT

## 2020-10-31 PROCEDURE — 36415 COLL VENOUS BLD VENIPUNCTURE: CPT

## 2020-11-12 ENCOUNTER — PATIENT MESSAGE (OUTPATIENT)
Dept: MEDICAL GROUP | Facility: CLINIC | Age: 50
End: 2020-11-12

## 2020-11-12 DIAGNOSIS — R10.13 DYSPEPSIA AND DISORDER OF FUNCTION OF STOMACH: ICD-10-CM

## 2020-11-12 DIAGNOSIS — K27.9 PEPTIC ULCER: ICD-10-CM

## 2020-11-12 DIAGNOSIS — K31.9 DYSPEPSIA AND DISORDER OF FUNCTION OF STOMACH: ICD-10-CM

## 2020-11-17 ENCOUNTER — TELEPHONE (OUTPATIENT)
Dept: MEDICAL GROUP | Facility: CLINIC | Age: 50
End: 2020-11-17

## 2020-11-17 ENCOUNTER — OFFICE VISIT (OUTPATIENT)
Dept: MEDICAL GROUP | Facility: CLINIC | Age: 50
End: 2020-11-17
Payer: COMMERCIAL

## 2020-11-17 VITALS
RESPIRATION RATE: 16 BRPM | OXYGEN SATURATION: 97 % | WEIGHT: 194 LBS | TEMPERATURE: 99 F | BODY MASS INDEX: 24.9 KG/M2 | DIASTOLIC BLOOD PRESSURE: 90 MMHG | HEIGHT: 74 IN | SYSTOLIC BLOOD PRESSURE: 142 MMHG | HEART RATE: 121 BPM

## 2020-11-17 DIAGNOSIS — R10.84 GENERALIZED ABDOMINAL PAIN: ICD-10-CM

## 2020-11-17 PROCEDURE — 99213 OFFICE O/P EST LOW 20 MIN: CPT | Performed by: PHYSICIAN ASSISTANT

## 2020-11-17 ASSESSMENT — PATIENT HEALTH QUESTIONNAIRE - PHQ9
5. POOR APPETITE OR OVEREATING: SEVERAL DAYS
3. TROUBLE FALLING OR STAYING ASLEEP OR SLEEPING TOO MUCH: NOT AT ALL
6. FEELING BAD ABOUT YOURSELF - OR THAT YOU ARE A FAILURE OR HAVE LET YOURSELF OR YOUR FAMILY DOWN: NOT AL ALL
2. FEELING DOWN, DEPRESSED, IRRITABLE, OR HOPELESS: SEVERAL DAYS
9. THOUGHTS THAT YOU WOULD BE BETTER OFF DEAD, OR OF HURTING YOURSELF: NOT AT ALL
7. TROUBLE CONCENTRATING ON THINGS, SUCH AS READING THE NEWSPAPER OR WATCHING TELEVISION: NOT AT ALL
SUM OF ALL RESPONSES TO PHQ9 QUESTIONS 1 AND 2: 1
1. LITTLE INTEREST OR PLEASURE IN DOING THINGS: NOT AT ALL
4. FEELING TIRED OR HAVING LITTLE ENERGY: SEVERAL DAYS
SUM OF ALL RESPONSES TO PHQ QUESTIONS 1-9: 3
8. MOVING OR SPEAKING SO SLOWLY THAT OTHER PEOPLE COULD HAVE NOTICED. OR THE OPPOSITE, BEING SO FIGETY OR RESTLESS THAT YOU HAVE BEEN MOVING AROUND A LOT MORE THAN USUAL: NOT AT ALL

## 2020-11-17 ASSESSMENT — FIBROSIS 4 INDEX: FIB4 SCORE: 0.84

## 2020-11-17 NOTE — LETTER
November 17, 2020       Patient: Arcelia Umanzor   YOB: 1970   Date of Visit: 11/17/2020         To Whom It May Concern:    In my medical opinion, I recommend that Arcelia Umanzor return to light duty with the following restrictions Patient is not to lift anything greater than 5 lbs for the next 2 weeks.    If you have any questions or concerns, please don't hesitate to call [phone]          Sincerely,          Mini Lazar P.A.-C.  Electronically Signed

## 2020-11-18 NOTE — PROGRESS NOTES
"cc:  Follow up    Subjective:     Arcelia Umanzor is a 50 y.o. male presenting for follow up      Patient presents to the office for follow up.  Patient states that the dycyclomine helps but after starting medication he had significant symptoms.  He had severe pain in the abdomen which lasted 90 minutes.  He states that he had a fast heart rate with the medication.  He has been having some lucid dreams.  However, it does help with the bloating. He is scheduled with GI on the 25th of this month.  He stopped the protonix as he felt it caused more bloating.  Patient was having symptoms with the dicyclomine and became irritable at work.  He states that his boss is now needing a note in order for him to return to work.  He states that he felt a pop while he was working and states that his symptoms started soon after.  He states that the more physical his day, the worse his stomach feels.     He states that the wellbutrin did not help.  He states that it did not help the cravings at all.       Review of systems:  See above.   Denies any symptoms unless previously indicated.      No current outpatient medications on file.    Allergies, past medical history, past surgical history, family history, social history reviewed and updated    Objective:     Vitals: /90 (BP Location: Left arm, Patient Position: Sitting, BP Cuff Size: Large adult)   Pulse (!) 121   Temp 37.2 °C (99 °F) (Temporal)   Resp 16   Ht 1.88 m (6' 2\") Comment: obtained from pt chart  Wt 88 kg (194 lb) Comment: with shoes on  SpO2 97%   BMI 24.91 kg/m²   General: Alert, pleasant, NAD  EYES:   PERRL, EOMI, no icterus or pallor.  Conjunctivae and lids normal.   HENT:  Normocephalic.  External ears normal.  Neck supple.   Respiratory: Normal respiratory effort.   Abdomen: Vertical surgical scar mid abdomen.  There is a bulging mass on the lower left quadrant near the surgical incision that is somewhat reducible.  There is some mild pain with " palpation.  Skin: Warm, dry, no rashes.  Musculoskeletal: Gait is normal.  Moves all extremities well.    Extremities: Range of motion all extremities.   Neurological: No tremors, sensation grossly intact,  CN2-12 intact.  Psych:  Affect/mood is normal, judgement is good, memory is intact, grooming is appropriate.    Assessment/Plan:     Arcelia was seen today for medication management.    Diagnoses and all orders for this visit:    Generalized abdominal pain  -     CT-ABDOMEN-PELVIS WITH & W/O; Future    It does appear patient has a lower right quadrant ventral hernia.  We will obtain a CT scan to evaluate further and if needed will refer to general surgery when we follow-up with test results.  In the meantime we will keep him on light duty to limit the potential for reinjury or worsening of symptoms.        Return in about 2 weeks (around 12/1/2020), or if symptoms worsen or fail to improve.    Please note that this dictation was created using voice recognition software. I have made every reasonable attempt to correct obvious errors, but expect that there are errors of grammar and possible content that I did not discover before finalizing note.

## 2020-12-19 ENCOUNTER — HOSPITAL ENCOUNTER (OUTPATIENT)
Dept: RADIOLOGY | Facility: MEDICAL CENTER | Age: 50
End: 2020-12-19
Attending: PHYSICIAN ASSISTANT
Payer: COMMERCIAL

## 2020-12-19 DIAGNOSIS — R10.84 GENERALIZED ABDOMINAL PAIN: ICD-10-CM

## 2020-12-19 PROCEDURE — 74177 CT ABD & PELVIS W/CONTRAST: CPT

## 2020-12-19 PROCEDURE — 700117 HCHG RX CONTRAST REV CODE 255: Performed by: PHYSICIAN ASSISTANT

## 2020-12-19 RX ADMIN — IOHEXOL 25 ML: 240 INJECTION, SOLUTION INTRATHECAL; INTRAVASCULAR; INTRAVENOUS; ORAL at 15:38

## 2020-12-19 RX ADMIN — IOHEXOL 100 ML: 350 INJECTION, SOLUTION INTRAVENOUS at 15:37

## 2020-12-28 ENCOUNTER — PATIENT MESSAGE (OUTPATIENT)
Dept: MEDICAL GROUP | Facility: CLINIC | Age: 50
End: 2020-12-28

## 2020-12-28 DIAGNOSIS — K27.9 PEPTIC ULCER: ICD-10-CM

## 2020-12-29 RX ORDER — OMEPRAZOLE 20 MG/1
20 CAPSULE, DELAYED RELEASE ORAL DAILY
Qty: 30 CAP | Refills: 1 | Status: SHIPPED | OUTPATIENT
Start: 2020-12-29 | End: 2021-05-04

## 2021-01-12 ENCOUNTER — OFFICE VISIT (OUTPATIENT)
Dept: URGENT CARE | Facility: PHYSICIAN GROUP | Age: 51
End: 2021-01-12
Payer: COMMERCIAL

## 2021-01-12 ENCOUNTER — HOSPITAL ENCOUNTER (OUTPATIENT)
Facility: MEDICAL CENTER | Age: 51
End: 2021-01-12
Attending: FAMILY MEDICINE
Payer: COMMERCIAL

## 2021-01-12 VITALS
RESPIRATION RATE: 16 BRPM | OXYGEN SATURATION: 97 % | HEIGHT: 74 IN | SYSTOLIC BLOOD PRESSURE: 122 MMHG | DIASTOLIC BLOOD PRESSURE: 80 MMHG | BODY MASS INDEX: 25.8 KG/M2 | HEART RATE: 101 BPM | TEMPERATURE: 99 F | WEIGHT: 201 LBS

## 2021-01-12 DIAGNOSIS — Z20.822 EXPOSURE TO COVID-19 VIRUS: ICD-10-CM

## 2021-01-12 DIAGNOSIS — R51.9 NONINTRACTABLE HEADACHE, UNSPECIFIED CHRONICITY PATTERN, UNSPECIFIED HEADACHE TYPE: ICD-10-CM

## 2021-01-12 DIAGNOSIS — R52 BODY ACHES: ICD-10-CM

## 2021-01-12 DIAGNOSIS — Z20.822 SUSPECTED COVID-19 VIRUS INFECTION: ICD-10-CM

## 2021-01-12 PROCEDURE — 99214 OFFICE O/P EST MOD 30 MIN: CPT | Performed by: FAMILY MEDICINE

## 2021-01-12 PROCEDURE — U0003 INFECTIOUS AGENT DETECTION BY NUCLEIC ACID (DNA OR RNA); SEVERE ACUTE RESPIRATORY SYNDROME CORONAVIRUS 2 (SARS-COV-2) (CORONAVIRUS DISEASE [COVID-19]), AMPLIFIED PROBE TECHNIQUE, MAKING USE OF HIGH THROUGHPUT TECHNOLOGIES AS DESCRIBED BY CMS-2020-01-R: HCPCS

## 2021-01-12 PROCEDURE — U0005 INFEC AGEN DETEC AMPLI PROBE: HCPCS

## 2021-01-12 RX ORDER — KETOROLAC TROMETHAMINE 30 MG/ML
60 INJECTION, SOLUTION INTRAMUSCULAR; INTRAVENOUS ONCE
Status: COMPLETED | OUTPATIENT
Start: 2021-01-12 | End: 2021-01-12

## 2021-01-12 RX ADMIN — KETOROLAC TROMETHAMINE 60 MG: 30 INJECTION, SOLUTION INTRAMUSCULAR; INTRAVENOUS at 18:40

## 2021-01-12 ASSESSMENT — FIBROSIS 4 INDEX: FIB4 SCORE: 0.84

## 2021-01-12 NOTE — LETTER
January 12, 2021         Patient: Arcelia Umanzor   YOB: 1970   Date of Visit: 1/12/2021           To Whom it May Concern:    Arcelia Umanzor was seen in my clinic on 1/12/2021.     Your employee was seen in our clinic today. A concern for COVID-19 has been identified and testing is in progress. We are asking you to excuse absences as well following self-isolation protocol per Center for Disease Control (CDC). You employee will be able to access test results through our electronic delivery system called [employer].      If the results of testing are negative, and once there has been no fever (> than 100.4 F)  without treatment, and no vomiting or diarrhea for at least 24 hours, then return to work is approved.     If the results of testing are positive than your employer will be contacted by the [facility] or [facility] department for further instructions on duration of self-isolation and return to work protocol.  In general, this will also follow the CDC guidelines with a minimum of 10 days from the onset of symptoms and without fever, vomiting, or diarrhea as above.      In general, repeat testing is NOT necessary and not offered through the [facility] care.      This is the only note that will be provided from the [facility] for this visit. Your employee will require an appointment with a primary care provider if FMLA or disability forms are required.    If you have any questions or concerns, please don't hesitate to call.        Sincerely,           Khoi Patel M.D.  Electronically Signed

## 2021-01-13 DIAGNOSIS — Z20.822 SUSPECTED COVID-19 VIRUS INFECTION: ICD-10-CM

## 2021-01-13 DIAGNOSIS — R51.9 NONINTRACTABLE HEADACHE, UNSPECIFIED CHRONICITY PATTERN, UNSPECIFIED HEADACHE TYPE: ICD-10-CM

## 2021-01-13 DIAGNOSIS — Z20.822 EXPOSURE TO COVID-19 VIRUS: ICD-10-CM

## 2021-01-13 DIAGNOSIS — R52 BODY ACHES: ICD-10-CM
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
## 2021-01-14 LAB
COVID ORDER STATUS COVID19: NORMAL
SARS-COV-2 RNA RESP QL NAA+PROBE: NOTDETECTED
SPECIMEN SOURCE: NORMAL

## 2021-02-08 ENCOUNTER — NON-PROVIDER VISIT (OUTPATIENT)
Dept: URGENT CARE | Facility: PHYSICIAN GROUP | Age: 51
End: 2021-02-08

## 2021-02-08 DIAGNOSIS — Z02.1 PRE-EMPLOYMENT DRUG SCREENING: ICD-10-CM

## 2021-02-08 PROCEDURE — 80305 DRUG TEST PRSMV DIR OPT OBS: CPT | Performed by: FAMILY MEDICINE

## 2021-03-31 ENCOUNTER — OCCUPATIONAL MEDICINE (OUTPATIENT)
Dept: URGENT CARE | Facility: PHYSICIAN GROUP | Age: 51
End: 2021-03-31
Payer: COMMERCIAL

## 2021-03-31 ENCOUNTER — APPOINTMENT (OUTPATIENT)
Dept: RADIOLOGY | Facility: IMAGING CENTER | Age: 51
End: 2021-03-31
Attending: NURSE PRACTITIONER
Payer: COMMERCIAL

## 2021-03-31 VITALS
HEART RATE: 71 BPM | DIASTOLIC BLOOD PRESSURE: 80 MMHG | WEIGHT: 194 LBS | OXYGEN SATURATION: 100 % | BODY MASS INDEX: 24.9 KG/M2 | SYSTOLIC BLOOD PRESSURE: 110 MMHG | HEIGHT: 74 IN | RESPIRATION RATE: 12 BRPM

## 2021-03-31 DIAGNOSIS — S86.911A KNEE STRAIN, RIGHT, INITIAL ENCOUNTER: ICD-10-CM

## 2021-03-31 DIAGNOSIS — S89.91XA INJURY OF RIGHT KNEE, INITIAL ENCOUNTER: ICD-10-CM

## 2021-03-31 DIAGNOSIS — Z02.6 ENCOUNTER FOR ASSESSMENT OF WORK-RELATED CAUSATION OF INJURY: ICD-10-CM

## 2021-03-31 DIAGNOSIS — Z02.1 PRE-EMPLOYMENT DRUG SCREENING: Primary | ICD-10-CM

## 2021-03-31 LAB
AMP AMPHETAMINE: NORMAL
BREATH ALCOHOL COMMENT: NEGATIVE
COC COCAINE: NORMAL
INT CON NEG: NORMAL
INT CON POS: NORMAL
MET METHAMPHETAMINES: NORMAL
OPI OPIATES: NORMAL
PCP PHENCYCLIDINE: NORMAL
POC BREATHALIZER: 0 PERCENT (ref 0–0.01)
POC DRUG COMMENT 753798-OCCUPATIONAL HEALTH: NORMAL
THC: NORMAL

## 2021-03-31 PROCEDURE — 73562 X-RAY EXAM OF KNEE 3: CPT | Mod: TC,FY,RT | Performed by: NURSE PRACTITIONER

## 2021-03-31 PROCEDURE — 82075 ASSAY OF BREATH ETHANOL: CPT | Performed by: NURSE PRACTITIONER

## 2021-03-31 PROCEDURE — 99203 OFFICE O/P NEW LOW 30 MIN: CPT | Performed by: NURSE PRACTITIONER

## 2021-03-31 PROCEDURE — 80305 DRUG TEST PRSMV DIR OPT OBS: CPT | Performed by: NURSE PRACTITIONER

## 2021-03-31 ASSESSMENT — ENCOUNTER SYMPTOMS
SENSORY CHANGE: 0
CHILLS: 0
BACK PAIN: 0
NECK PAIN: 0
WEAKNESS: 0
FEVER: 0
HEADACHES: 0
DIZZINESS: 0
FOCAL WEAKNESS: 0
FALLS: 0

## 2021-03-31 ASSESSMENT — FIBROSIS 4 INDEX: FIB4 SCORE: 0.86

## 2021-03-31 NOTE — LETTER
"EMPLOYEE’S CLAIM FOR COMPENSATION/ REPORT OF INITIAL TREATMENT  FORM C-4    EMPLOYEE’S CLAIM - PROVIDE ALL INFORMATION REQUESTED   First Name  Arcelia Last Name  Noé Birthdate                    1970                Sex  male Claim Number   Home Address  [address]  Age  51 y.o. 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 (if applicable)  working    How did this injury or occupational disease occur? (Be specific an answer in detail. Use additional sheet if necessary)  was walking to grab a tool and knee popped and snapped   If you believe that you have an occupational disease, when did you first have knowledge of the disability and it relationship to your employment?  N/A Witnesses to the Accident  [place]      Nature of Injury or Occupational Disease    Part(s) of Body Injured or Affected  Knee (R), N/A, N/A    I certify that the above is true and correct to the best of my knowledge and that I have provided this information in order to obtain the benefits of [place]  I hereby authorize any physician, chiropractor, surgeon, practitioner, or other person, any hospital, including [facility] or [facility] hospital, any medical service organization, any insurance company, or other institution or organization to release to each other, any medical or other information, including benefits paid or payable, pertinent to this injury or disease, except information relative to diagnosis, treatment and/or counseling for AIDS, psychological conditions, alcohol or controlled substances, for which I must give specific authorization.  A Photostat of this authorization shall be as valid as the original.     Date 3/31/2021   Place [facility]   Employee’s Signature   THIS REPORT MUST BE COMPLETED AND MAILED WITHIN 3 WORKING DAYS OF TREATMENT   Place  [facility]  Name of Facility  [facility]   Date  3/31/2021 Diagnosis  (S89.91XA) Injury of right knee, initial encounter Is there evidence the injured employee was under the              influence of alcohol and/or another controlled substance at the time of accident?   Hour  10:20 AM Description of Injury or Disease  The encounter diagnosis was Injury of right knee, initial encounter.     Treatment  OTC ibuprofen, tylenol. Rest, ice, elevation. Knee brace at all times when walking   Have you advised the patient to remain off work five days or     more? No   X-Ray Findings  Negative   If Yes   From Date  To Date      From information given by the employee, together with medical evidence, can you directly connect this injury or occupational disease as job incurred?  Yes If No Full Duty    No Modified Duty  Yes   Is additional medical care by a physician indicated?  Yes If Modified Duty, Specify any Limitations / Restrictions  NO stooping, climbing, bending or squatting on right leg. 10 pound weight restriction    Do you know of any previous injury or disease  "contributing to this condition or occupational disease?                            No   Date  3/31/2021 Print Doctor’s Name   FRANCO Gomez I certify the employer’s copy of  this form was mailed on:   Address  [address]urer’s Use Only     [place] Zip  [address]    Provider’s Tax ID Number  [id number] Telephone  Dept: [phone]      mark-JARED Keita  Signature:     Degree          ORIGINAL-TREATING PHYSICIAN OR CHIROPRACTOR    PAGE 2-INSURER/TPA    PAGE 3-EMPLOYER    PAGE 4-EMPLOYEE        Form C-4 (rev.10/07)           BRIEF DESCRIPTION OF RIGHTS AND BENEFITS  (Pursuant to [place] 616C.050)    Notice of Injury or Occupational Disease (Incident Report Form C-1): If an injury or occupational disease (OD) arises out of and in the course of employment, you must provide written notice to your employer as soon as practicable, but no later than 7 days after the accident or OD. Your employer shall maintain a sufficient supply of the required forms.    Claim for Compensation (Form C-4): If medical treatment is sought, the form C-4 is available at the place of initial treatment. A completed \"Claim for Compensation\" (Form C-4) must be filed within 90 days after an accident or OD. The treating physician or chiropractor must, within 3 working days after treatment, complete and mail to the employer, the employer's insurer and third-party , the Claim for Compensation.    Medical Treatment: If you require medical treatment for your on-the-job injury or OD, you may be required to select a physician or chiropractor from a list provided by your workers’ compensation insurer, if it has contracted with an Organization for Managed Care (MCO) or Preferred Provider Organization (PPO) or providers of health care. If your employer has not entered into a contract with an MCO or PPO, you may select a physician or chiropractor from the Panel of Physicians and " Chiropractors. Any medical costs related to your industrial injury or OD will be paid by your insurer.    Temporary Total Disability (TTD): If your doctor has certified that you are unable to work for a period of at least 5 consecutive days, or 5 cumulative days in a 20-day period, or places restrictions on you that your employer does not accommodate, you may be entitled to TTD compensation.    Temporary Partial Disability (TPD): If the wage you receive upon reemployment is less than the compensation for TTD to which you are entitled, the insurer may be required to pay you TPD compensation to make up the difference. TPD can only be paid for a maximum of 24 months.    Permanent Partial Disability (PPD): When your medical condition is stable and there is an indication of a PPD as a result of your injury or OD, within 30 days, your insurer must arrange for an evaluation by a rating physician or chiropractor to determine the degree of your PPD. The amount of your PPD award depends on the date of injury, the results of the PPD evaluation, your age and wage.    Permanent Total Disability (PTD): If you are medically certified by a treating physician or chiropractor as permanently and totally disabled and have been granted a PTD status by your insurer, you are entitled to receive monthly benefits not to exceed 66 2/3% of your average monthly wage. The amount of your PTD payments is subject to reduction if you previously received a lump-sum PPD award.    Vocational Rehabilitation Services: You may be eligible for vocational rehabilitation services if you are unable to return to the job due to a permanent physical impairment or permanent restrictions as a result of your injury or occupational disease.    Transportation and Per Shon Reimbursement: You may be eligible for travel expenses and per shon associated with medical treatment.    Reopening: You may be able to reopen your claim if your condition worsens after claim  closure.     Appeal Process: If you disagree with a written determination issued by the insurer or the insurer does not respond to your request, you may appeal to the Department of Administration, , by following the instructions contained in your determination letter. You must appeal the determination within 70 days from the date of the determination letter at [address], [address], [place], [place] [address], or [address], [address], [place], [place] [address]. If you disagree with the  decision, you may appeal to the Department of Administration, . You must file your appeal within 30 days from the date of the  decision letter at [address], [address], [place], [place] [address], or [address], [address], [place], [place] [address]. If you disagree with a decision of an , you may file a petition for judicial review with the District Court. You must do so within 30 days of the Appeal Officer’s decision. You may be represented by an  at your own expense or you may contact the [facility] for possible representation.    [place]  for Injured Workers (NAIW): If you disagree with a  decision, you may request that NAIW represent you without charge at an  Hearing. For information regarding denial of benefits, you may contact the [facility] at: [address], [address], [place] [address], [phone], or [address], [address], [place] [address], [phone]    To File a Complaint with the Division: If you wish to file a complaint with the  of the Division of Industrial Relations (DIR),  please contact the Workers’ Compensation Section, [address], [address], [place], [place] [address], telephone [phone], or [address], [place], [place] [address], telephone [phone].    For assistance with Workers’  Compensation Issues: You may contact the [facility] Office for Consumer Health Assistance, [address], [place] [address], [place] [address], Toll Free 1-766.253.1147, Web site: http://[facility].[place].gov/Programs/CHET E-mail: chet@[facility].nv.[facility]              __________________________________________________________________                                    _________________            Employee Name / Signature                                                                                                                            Date                                                                                                                                                                                                                              D-2 (rev. 10/20)

## 2021-03-31 NOTE — LETTER
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    Medical Information   Related to Industrial Injury? Yes    Subjective Complaints:  DOI: 03/30/2021: Patient states he was walking to grab a crowbar and all of a sudden his right knee popped like popcorn and created severe pain.  Patient denies previous injury to the area.  Denies numbness or tingling in his right lower extremity.  He is having some rating pain up to his right hip.  Patient denies rolling his ankle during this time, stepping on loose gravel or uneven ground.  Patient states he feels like he is unstable.  His pain has been so bad he was taking hydrocodone yesterday that he had at home.  Patient denies second job    Review of Systems   Constitutional: Negative for chills, fever and malaise/fatigue.   Musculoskeletal: Positive for joint pain. Negative for back pain, falls and neck pain.   Neurological: Negative for dizziness, sensory change, focal weakness, weakness and headaches.   All other systems reviewed and are negative.     Objective Findings: Physical Exam  Vitals reviewed.   Constitutional:       Appearance: Normal appearance.   Cardiovascular:      Rate and Rhythm: Normal rate and regular rhythm.      Heart sounds: Normal heart sounds.   Pulmonary:      Effort: Pulmonary effort is normal.      Breath sounds: Normal breath sounds.   Musculoskeletal:      Right knee: No swelling, deformity, effusion, erythema, ecchymosis, lacerations,  bony tenderness or crepitus. Decreased range of motion. Tenderness present over the medial joint line and lateral joint line. No LCL laxity, MCL laxity, ACL laxity or PCL laxity. Normal alignment, normal meniscus and normal patellar mobility. Normal pulse.      Instability Tests: Negative medial Estefania test and negative lateral Estefania test.   Skin:     Capillary Refill: Capillary refill takes less than 2 seconds.   Neurological:      Mental Status: He is alert and oriented to person, place, and time.   Psychiatric:         Mood and Affect: Mood normal.         Behavior: Behavior normal.         Thought Content: Thought content normal.         Judgment: Judgment normal.        Pre-Existing Condition(s):     Assessment:   Initial Visit    Status: Additional Care Required  Permanent Disability:No    Plan:   Comments:OTC ibuprofen, tylenol, rest, ice, elevation. Knee brace at all times with walking    Diagnostics: X-ray  Comments:Negative for fracture    Comments:       Disability Information   Status: Released to Restricted Duty    From:  3/31/2021  Through: 2021 Restrictions are: Temporary   Physical Restrictions   Sitting:    Standing:    Stoopin hrs/day  Comments:Right leg Bendin hrs/day  Comments:right leg   Squattin hrs/day  Comments:Right leg Walking:    Climbin hrs/day  Comments:Right leg Pushing:      Pulling:    Other:    Reaching Above Shoulder (L):   Reaching Above Shoulder (R):       Reaching Below Shoulder (L):    Reaching Below Shoulder (R):      Not to exceed Weight Limits   Carrying(hrs):   Weight Limit(lb): < or = to 10 pounds Lifting(hrs):   Weight  Limit(lb): < or = to 10 pounds   Comments:      Repetitive Actions   Hands: i.e. Fine Manipulations from Grasping:     Feet: i.e. Operating Foot Controls:     Driving / Operate Machinery:     Provider Name:   FRANCO Gomez Physician Signature:  Physician Name:     Clinic Name / Location: [facility]  [place]  [address] [place] [address]  [place] [address] Clinic Phone Number: Dept: [phone]   Appointment Time: 10:05 Am Visit Start Time: 10:20 AM   Check-In Time:  10:13 Am Visit Discharge Time:  11:14AM   Original-Treating Physician or Chiropractor    Page 2-Insurer/TPA    Page 3-Employer    Page 4-Employee

## 2021-03-31 NOTE — PROGRESS NOTES
"Subjective:      Arcelia Umanzor is a 51 y.o. male who presents with Knee Pain (Right knee pain/injury 3/30/21 .  Took Hydrocodone last night due to pain)      DOI: 03/30/2021: Patient states he was walking to grab a crowbar and all of a sudden his right knee popped like popcorn and created severe pain.  Patient denies previous injury to the area.  Denies numbness or tingling in his right lower extremity.  He is having some rating pain up to his right hip.  Patient denies rolling his ankle during this time, stepping on loose gravel or uneven ground.  Patient states he feels like he is unstable.  His pain has been so bad he was taking hydrocodone yesterday that he had at home.  Patient denies second job     HPI  See above    Review of Systems   Constitutional: Negative for chills, fever and malaise/fatigue.   Musculoskeletal: Positive for joint pain. Negative for back pain, falls and neck pain.   Neurological: Negative for dizziness, sensory change, focal weakness, weakness and headaches.   All other systems reviewed and are negative.        PMH: No pertinent past medical history to this problem  MEDS: Medications were reviewed in Epic  ALLERGIES: Allergies were reviewed in Epic  SOCHX: Works at [employer]  FH: No pertinent family history to this problem            Objective:     /80   Pulse 71   Resp 12   Ht 1.88 m (6' 2\")   Wt 88 kg (194 lb)   SpO2 100%   BMI 24.91 kg/m²      Physical Exam  Vitals reviewed.   Constitutional:       Appearance: Normal appearance.   Cardiovascular:      Rate and Rhythm: Normal rate and regular rhythm.      Heart sounds: Normal heart sounds.   Pulmonary:      Effort: Pulmonary effort is normal.      Breath sounds: Normal breath sounds.   Musculoskeletal:      Right knee: No swelling, deformity, effusion, erythema, ecchymosis, lacerations, bony tenderness or crepitus. Decreased range of motion. Tenderness present over the medial joint line and lateral joint line. No LCL " laxity, MCL laxity, ACL laxity or PCL laxity. Normal alignment, normal meniscus and normal patellar mobility. Normal pulse.      Instability Tests: Negative medial Estefania test and negative lateral Estefania test.   Skin:     Capillary Refill: Capillary refill takes less than 2 seconds.   Neurological:      Mental Status: He is alert and oriented to person, place, and time.   Psychiatric:         Mood and Affect: Mood normal.         Behavior: Behavior normal.         Thought Content: Thought content normal.         Judgment: Judgment normal.                 Assessment/Plan:        1. Injury of right knee, initial encounter  DX-KNEE 3 VIEWS RIGHT   2. Knee strain, right, initial encounter     3. Encounter for assessment of work-related causation of injury     FINDINGS:        There is no significant joint effusion.     There is no evidence of displaced  fracture or dislocation.     There is very minimal spurring along the posterior superior patella.     IMPRESSION:     1.  There is no acute fracture or joint effusion of the right knee.  All images read and interpreted by radiology - discussed with patient       See D 39 and C4  FUV in 7 days

## 2021-04-01 ENCOUNTER — TELEPHONE (OUTPATIENT)
Dept: URGENT CARE | Facility: PHYSICIAN GROUP | Age: 51
End: 2021-04-01

## 2021-04-01 NOTE — TELEPHONE ENCOUNTER
"Patient called wanting to know why his drug test for his WC had come back \"inconclusive\", I stated I wasn't aware the situation and when I discussed the situation with our [facility] health rep (Fatoumata) she said either give him the option to call [facility] and he can wait for test results to be uploaded. So I gave him the number to call.    "

## 2021-04-07 ENCOUNTER — OCCUPATIONAL MEDICINE (OUTPATIENT)
Dept: URGENT CARE | Facility: PHYSICIAN GROUP | Age: 51
End: 2021-04-07
Payer: COMMERCIAL

## 2021-04-07 VITALS
SYSTOLIC BLOOD PRESSURE: 128 MMHG | RESPIRATION RATE: 12 BRPM | TEMPERATURE: 98.2 F | HEART RATE: 94 BPM | WEIGHT: 200 LBS | DIASTOLIC BLOOD PRESSURE: 80 MMHG | BODY MASS INDEX: 25.67 KG/M2 | HEIGHT: 74 IN | OXYGEN SATURATION: 98 %

## 2021-04-07 DIAGNOSIS — S86.911D KNEE STRAIN, RIGHT, SUBSEQUENT ENCOUNTER: ICD-10-CM

## 2021-04-07 PROCEDURE — 99213 OFFICE O/P EST LOW 20 MIN: CPT | Performed by: PHYSICIAN ASSISTANT

## 2021-04-07 RX ORDER — PREDNISONE 10 MG/1
TABLET ORAL
Qty: 1 QUANTITY SUFFICIENT | Refills: 0 | Status: SHIPPED | OUTPATIENT
Start: 2021-04-07 | End: 2021-05-04

## 2021-04-07 ASSESSMENT — FIBROSIS 4 INDEX: FIB4 SCORE: 0.86

## 2021-04-07 NOTE — PROGRESS NOTES
"Chief Complaint   Patient presents with   • Work-Related Injury     R knee injury, 8 days ago        HISTORY OF PRESENT ILLNESS: Patient is a 51 y.o. male who presents today for the following:    DOI: 03/30/2021, second evaluation  ALEXANDER: Patient states he was walking to grab a crowbar and all of a sudden his right knee popped like popcorn and created severe pain.  Patient denies previous injury to the area.  Denies numbness or tingling in his right lower extremity.  Patient denies second job. CURRENTLY: Overall some improvement.  Has been wearing the brace at work which seems to help.  Minimal improvement with Advil.  Continues to have pain with a twisting motion and resisted flexion and extension.  Denies soft tissue swelling.  Indicates his employer was not abiding by the restrictions he was given on his previous visit here.    Allergies:Patient has no known allergies.    PMH: No pertinent past medical history to this problem  MEDS: Medications were reviewed in Epic  ALLERGIES: Allergies were reviewed in Epic  SOCHX: Works at [employer]  FH: No pertinent family history to this problem    Review of Systems   Constitutional ROS: No unexpected change in weight, No weakness, No fatigue  Musculoskeletal/Extremities ROS: Right knee pain.    Exam:  /80   Pulse 94   Temp 36.8 °C (98.2 °F) (Temporal)   Resp 12   Ht 1.88 m (6' 2\")   Wt 90.7 kg (200 lb)   SpO2 98%   General: Well developed, well nourished. No distress.  Pulmonary: Unlabored respiratory effort.    Musculoskeletal: Small faint ecchymotic lesion noted on the medial aspect of the right knee.  Minimal localized tenderness noted in the same area.  Right posterior knee pain with resisted knee flexion and mild diffuse right knee pain with resisted extension.  Knee exam is otherwise unremarkable without any obvious effusion.  No antalgic gait noted.  Neurologic: Grossly nonfocal. No facial asymmetry noted.  Skin: Warm, dry, good turgor. No rashes in " visible areas.   Psych: Normal mood. Alert and oriented x3. Judgment and insight is normal.    Assessment/Plan:  Some improvement overall.  Still has pain with certain movements.  Trial of prednisone.  Continue wearing the brace.  Feels he can return to work without restrictions at this time.  Return to clinic in 2 weeks for reevaluation, sooner for any significant changes in symptoms.  1. Knee strain, right, subsequent encounter  predniSONE (DELTASONE) 10 MG Tab

## 2021-04-07 NOTE — LETTER
[address] [place] [address]  Phone:  [phone] - Fax:  [phone]   [facility] Progress Report and Disability Certification  Date of Service: 4/7/2021   No Show:  No  Date / Time of Next Visit: 4/21/2021   Claim Information   Patient Name: Arcelia Umanzor  Claim Number:     Employer: [employer]  Date of Injury: 3/30/2021     Insurer / TPA: [employer]  ID / SSN:     Occupation: [employer]  Diagnosis: The encounter diagnosis was Knee strain, right, subsequent encounter.    Medical Information   Related to Industrial Injury? Yes    Subjective Complaints:  DOI: 03/30/2021, second evaluation  ALEXANDER: Patient states he was walking to grab a crowbar and all of a sudden his right knee popped like popcorn and created severe pain.  Patient denies previous injury to the area.  Denies numbness or tingling in his right lower extremity.  Patient denies second job. CURRENTLY: Overall some improvement.  Has been wearing the brace at work which seems to help.  Minimal improvement with Advil.  Continues to have pain with a twisting motion and resisted flexion and extension.  Denies soft tissue swelling.  Indicates his employer was not abiding by the restrictions he was given on his previous visit here.   Objective Findings: Musculoskeletal: Small faint ecchymotic lesion noted on the medial aspect of the right knee.  Minimal localized tenderness noted in the same area.  Right posterior knee pain with resisted knee flexion and mild diffuse right knee pain with resisted extension.  Knee exam is otherwise unremarkable without any obvious effusion.  No antalgic gait noted.   Pre-Existing Condition(s):     Assessment:   Condition Improved    Status: Additional Care Required  Permanent Disability:No    Plan:      Diagnostics:      Comments:  Some improvement overall.  Still has pain with certain movements.  Trial of prednisone.  Continue wearing the  brace.  Feels he can return to work without restrictions at this time.  Return to clinic in 2 weeks for reevaluation, sooner for any signifi  cant changes in symptoms.  1. Knee strain, right, subsequent encounter  predniSONE (DELTASONE) 10 MG Tab        Disability Information   Status: Released to Full Duty    From:  4/7/2021  Through: 4/21/2021 Restrictions are:     Physical Restrictions   Sitting:    Standing:    Stooping:    Bending:      Squatting:    Walking:    Climbing:    Pushing:      Pulling:    Other:    Reaching Above Shoulder (L):   Reaching Above Shoulder (R):       Reaching Below Shoulder (L):    Reaching Below Shoulder (R):      Not to exceed Weight Limits   Carrying(hrs):   Weight Limit(lb):   Lifting(hrs):   Weight  Limit(lb):     Comments:      Repetitive Actions   Hands: i.e. Fine Manipulations from Grasping:     Feet: i.e. Operating Foot Controls:     Driving / Operate Machinery:     Provider Name:   Essie Renteria P.A.-C. Physician Signature:  Physician Name:     Clinic Name / Location: [address] [address] Clinic Phone Number: Dept: [phone]   Appointment Time: 3:40 Pm Visit Start Time: 3:43 PM   Check-In Time:  3:32 Pm Visit Discharge Time:  4:19PM   Original-Treating Physician or Chiropractor    Page 2-Insurer/TPA    Page 3-Employer    Page 4-Employee

## 2021-04-21 ENCOUNTER — OCCUPATIONAL MEDICINE (OUTPATIENT)
Dept: URGENT CARE | Facility: PHYSICIAN GROUP | Age: 51
End: 2021-04-21
Payer: COMMERCIAL

## 2021-04-21 VITALS
DIASTOLIC BLOOD PRESSURE: 98 MMHG | HEART RATE: 76 BPM | WEIGHT: 203 LBS | BODY MASS INDEX: 26.05 KG/M2 | OXYGEN SATURATION: 96 % | RESPIRATION RATE: 16 BRPM | HEIGHT: 74 IN | TEMPERATURE: 97.2 F | SYSTOLIC BLOOD PRESSURE: 122 MMHG

## 2021-04-21 DIAGNOSIS — S86.911D STRAIN OF RIGHT KNEE, SUBSEQUENT ENCOUNTER: ICD-10-CM

## 2021-04-21 PROCEDURE — 99214 OFFICE O/P EST MOD 30 MIN: CPT | Performed by: FAMILY MEDICINE

## 2021-04-21 ASSESSMENT — PAIN SCALES - GENERAL: PAINLEVEL: 4=SLIGHT-MODERATE PAIN

## 2021-04-21 ASSESSMENT — FIBROSIS 4 INDEX: FIB4 SCORE: 0.86

## 2021-04-21 NOTE — LETTER
"   [facility]  [address] [facility] [place]  [place] [address]  Phone:  [phone] - Fax:  [phone]   [facility] Progress Report and Disability Certification  Date of Service: 4/21/2021   No Show:  No  Date / Time of Next Visit: 5/4/2021   Claim Information   Patient Name: Arcelia Umanzor  Claim Number:     Employer: [employer]  Date of Injury: 3/30/2021     Insurer / TPA: [employer]  ID / SSN:     Occupation: [employer]  Diagnosis: The encounter diagnosis was Strain of right knee, subsequent encounter.    Medical Information   Related to Industrial Injury? Yes    Subjective Complaints:  \"DOI: 03/30/2021: Patient states he was walking to grab a crowbar and all of a sudden his right knee popped like popcorn and created severe pain.\"  ** today 4/21/2021 feels same since last visit. Pain stiffness, worse w/ walking or getting up/down chair.    Objective Findings:     Pre-Existing Condition(s):     Assessment:   Condition Same    Status: Discharged / Care Transfer  Permanent Disability:No    Plan: Transfer Care    Diagnostics:      Comments:       Disability Information   Status: Released to Restricted Duty    From:  4/21/2021  Through: 5/4/2021 Restrictions are: Temporary   Physical Restrictions   Sitting:    Standing:    Stooping:    Bending:      Squatting:    Walking:    Climbing:    Pushing:      Pulling:    Other:    Reaching Above Shoulder (L):   Reaching Above Shoulder (R):       Reaching Below Shoulder (L):    Reaching Below Shoulder (R):      Not to exceed Weight Limits   Carrying(hrs):   Weight Limit(lb):   Lifting(hrs):   Weight  Limit(lb):     Comments: No running, jumping, climbing or squatting      Repetitive Actions   Hands: i.e. Fine Manipulations from Grasping:     Feet: i.e. Operating Foot Controls:     Driving / Operate Machinery:     Provider Name:   Ken Acevedo M.D. Physician Signature:  Physician Name:     Clinic Name / Location: " [facility] [address]  [place] [address] Clinic Phone Number: Dept: [phone]   Appointment Time: 5:20 Pm Visit Start Time: 5:05 PM   Check-In Time:  4:20 Pm Visit Discharge Time:  5:31PM   Original-Treating Physician or Chiropractor    Page 2-Insurer/TPA    Page 3-Employer    Page 4-Employee

## 2021-04-22 NOTE — PROGRESS NOTES
"Subjective:      Arcelia Umanzor is a 51 y.o. male who presents with Follow-Up (WC fv-not getting better, states that it is not getting worse either )      \"DOI: 03/30/2021: Patient states he was walking to grab a crowbar and all of a sudden his right knee popped like popcorn and created severe pain.\"  ** today 4/21/2021 feels same since last visit. Pain stiffness, worse w/ walking or getting up/down chair.      HPI    Review of Systems   Musculoskeletal: Positive for joint pain.   All other systems reviewed and are negative.         Objective:     /98   Pulse 76   Temp 36.2 °C (97.2 °F)   Resp 16   Ht 1.88 m (6' 2\")   Wt 92.1 kg (203 lb)   SpO2 96%   BMI 26.06 kg/m²      Physical Exam  Vitals and nursing note reviewed.   Constitutional:       General: He is not in acute distress.     Appearance: Normal appearance. He is well-developed.   HENT:      Head: Normocephalic.   Cardiovascular:      Heart sounds: Normal heart sounds. No murmur.   Pulmonary:      Effort: Pulmonary effort is normal. No respiratory distress.   Musculoskeletal:        Legs:       Comments: Some TTP and pain w/ ROM   Skin:     Coloration: Skin is not jaundiced or pale.   Neurological:      Mental Status: He is alert.      Motor: No abnormal muscle tone.   Psychiatric:         Mood and Affect: Mood normal.         Behavior: Behavior normal.                 Assessment/Plan:            1. Strain of right knee, subsequent encounter  REFERRAL TO ORTHOPEDICS       No running, jumping, climbing or squatting      Referred to ortho, f/u w/ them. May be seen here again in 1-2 wks if have not seen ortho yet     "

## 2021-05-04 ENCOUNTER — OCCUPATIONAL MEDICINE (OUTPATIENT)
Dept: URGENT CARE | Facility: PHYSICIAN GROUP | Age: 51
End: 2021-05-04
Payer: COMMERCIAL

## 2021-05-04 VITALS
DIASTOLIC BLOOD PRESSURE: 80 MMHG | SYSTOLIC BLOOD PRESSURE: 122 MMHG | HEIGHT: 74 IN | HEART RATE: 88 BPM | OXYGEN SATURATION: 98 % | RESPIRATION RATE: 12 BRPM | TEMPERATURE: 97.9 F | WEIGHT: 204 LBS | BODY MASS INDEX: 26.18 KG/M2

## 2021-05-04 DIAGNOSIS — K58.2 IRRITABLE BOWEL SYNDROME WITH BOTH CONSTIPATION AND DIARRHEA: ICD-10-CM

## 2021-05-04 DIAGNOSIS — S86.911D STRAIN OF RIGHT KNEE, SUBSEQUENT ENCOUNTER: ICD-10-CM

## 2021-05-04 DIAGNOSIS — R10.9 ABDOMINAL CRAMPING: ICD-10-CM

## 2021-05-04 PROCEDURE — 99214 OFFICE O/P EST MOD 30 MIN: CPT | Performed by: PHYSICIAN ASSISTANT

## 2021-05-04 RX ORDER — DICYCLOMINE HCL 20 MG
20 TABLET ORAL EVERY 6 HOURS
Qty: 60 TABLET | Refills: 2 | Status: SHIPPED | OUTPATIENT
Start: 2021-05-04 | End: 2022-09-01

## 2021-05-04 ASSESSMENT — FIBROSIS 4 INDEX: FIB4 SCORE: 0.86

## 2021-05-04 NOTE — LETTER
[address] [place] [address]  Phone:  [phone] - Fax:  [phone]   [facility] Progress Report and Disability Certification  Date of Service: 5/4/2021   No Show:  No  Date / Time of Next Visit: 6/1/2021   Claim Information   Patient Name: Arcelia Umanzor  Claim Number:     Employer: [employer]  Date of Injury: 3/30/2021     Insurer / TPA: [employer]  ID / SSN:     Occupation: [employer]  Diagnosis: The encounter diagnosis was Strain of right knee, subsequent encounter.    Medical Information   Related to Industrial Injury? Yes    Subjective Complaints:  DOI: 03/30/2021, 4th evaluation  ALEXANDER: Patient states he was walking to grab a crowbar and all of a sudden his right knee popped like popcorn and created severe pain.  Patient denies previous injury to the area.  Denies numbness or tingling in his right lower extremity.  Patient denies second job.  Refer to orthopedics on his last visit here.  CURRENTLY:  Overall patient has not had any improvement in his pain.  He continues to have pain on the medial aspect of his right knee.  Worse with extension, twisting, and ambulation.  He is no longer working at [employer].   Objective Findings: Musculoskeletal:  Minimal localized tenderness noted in the medial aspect of the right knee.   Antalgic gait noted, worse when for standing from a seated position.     Pre-Existing Condition(s):     Assessment:   Condition Same    Status: Additional Care Required  Permanent Disability:No    Plan: PT    Diagnostics:      Comments:       Disability Information   Status: Released to Full Duty    From:  5/4/2021  Through: 6/1/2021 Restrictions are:     Physical Restrictions   Sitting:    Standing:    Stooping:    Bending:      Squatting:    Walking:    Climbing:    Pushing:      Pulling:    Other:    Reaching Above Shoulder (L):   Reaching Above Shoulder (R):       Reaching Below Shoulder (L):     Reaching Below Shoulder (R):      Not to exceed Weight Limits   Carrying(hrs):   Weight Limit(lb):   Lifting(hrs):   Weight  Limit(lb):     Comments:      Repetitive Actions   Hands: i.e. Fine Manipulations from Grasping:     Feet: i.e. Operating Foot Controls:     Driving / Operate Machinery:     Provider Name:   Essie Renteria P.A.-C. Physician Signature:  Physician Name:     Clinic Name / Location: [address] [address] Clinic Phone Number: Dept: [phone]   Appointment Time: 6:00 Pm Visit Start Time: 6:14 PM   Check-In Time:  5:54 Pm Visit Discharge Time:  6:44pm   Original-Treating Physician or Chiropractor    Page 2-Insurer/TPA    Page 3-Employer    Page 4-Employee

## 2021-05-05 NOTE — PROGRESS NOTES
"Chief Complaint   Patient presents with   • Work-Related Injury     R knee injury, Not doing better        HISTORY OF PRESENT ILLNESS: Patient is a 51 y.o. male who presents today for the following:    DOI: 03/30/2021, 4th evaluation  ALEXANDER: Patient states he was walking to grab a crowbar and all of a sudden his right knee popped like popcorn and created severe pain.  Patient denies previous injury to the area.  Denies numbness or tingling in his right lower extremity.  Patient denies second job.  Refer to orthopedics on his last visit here.  CURRENTLY:  Overall patient has not had any improvement in his pain.  He continues to have pain on the medial aspect of his right knee.  Worse with extension, twisting, and ambulation.  He is no longer working at [employer].    Allergies:Patient has no known allergies.    PMH: No pertinent past medical history to this problem  MEDS: Medications were reviewed in Epic  ALLERGIES: Allergies were reviewed in Epic  SOCHX: Was working at [employer]  FH: No pertinent family history to this problem    Review of Systems   Constitutional ROS: No unexpected change in weight, No weakness, No fatigue  Musculoskeletal/Extremities ROS: Right knee pain.    Exam:  /80   Pulse 88   Temp 36.6 °C (97.9 °F) (Temporal)   Resp 12   Ht 1.88 m (6' 2\")   Wt 92.5 kg (204 lb)   SpO2 98%   General: Well developed, well nourished. No distress.  Pulmonary: Unlabored respiratory effort.    Musculoskeletal:  Minimal localized tenderness noted in the medial aspect of the right knee.   Antalgic gait noted, worse when for standing from a seated position.    Neurologic: Grossly nonfocal. No facial asymmetry noted.  Skin: Warm, dry, good turgor. No rashes in visible areas.   Psych: Normal mood. Alert and oriented x3. Judgment and insight is normal.    Assessment/Plan:  Orthopedic referral has not yet been processed.  Referring to physical therapy.  No restrictions dictated as patient is not currently " working.  Discussed appropriate dosing of ibuprofen/acetaminophen as needed for pain.  May use ice, heat, and over-the-counter muscle rubs as needed for additional pain relief.  Return to clinic in 1 month for reevaluation, sooner for any significant changes in symptoms.  1. Strain of right knee, subsequent encounter  REFERRAL TO PHYSICAL THERAPY

## 2022-08-18 ENCOUNTER — TELEMEDICINE (OUTPATIENT)
Dept: TELEHEALTH | Facility: TELEMEDICINE | Age: 52
End: 2022-08-18

## 2022-08-18 VITALS — HEIGHT: 74 IN | BODY MASS INDEX: 25.41 KG/M2 | WEIGHT: 198 LBS

## 2022-08-18 DIAGNOSIS — N30.00 ACUTE CYSTITIS WITHOUT HEMATURIA: ICD-10-CM

## 2022-08-18 PROCEDURE — 99213 OFFICE O/P EST LOW 20 MIN: CPT | Mod: 95 | Performed by: FAMILY MEDICINE

## 2022-08-18 RX ORDER — SULFAMETHOXAZOLE AND TRIMETHOPRIM 800; 160 MG/1; MG/1
1 TABLET ORAL 2 TIMES DAILY
Qty: 20 TABLET | Refills: 0 | Status: SHIPPED | OUTPATIENT
Start: 2022-08-18 | End: 2022-08-28

## 2022-08-18 RX ORDER — PHENAZOPYRIDINE HYDROCHLORIDE 200 MG/1
200 TABLET, FILM COATED ORAL 3 TIMES DAILY
Qty: 6 TABLET | Refills: 0 | Status: SHIPPED | OUTPATIENT
Start: 2022-08-18 | End: 2022-08-20

## 2022-08-18 ASSESSMENT — FIBROSIS 4 INDEX: FIB4 SCORE: 0.88
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
## 2022-08-26 ENCOUNTER — HOSPITAL ENCOUNTER (OUTPATIENT)
Dept: LAB | Facility: MEDICAL CENTER | Age: 52
End: 2022-08-26
Attending: FAMILY MEDICINE

## 2022-08-26 DIAGNOSIS — N30.00 ACUTE CYSTITIS WITHOUT HEMATURIA: ICD-10-CM

## 2022-08-26 PROCEDURE — 87086 URINE CULTURE/COLONY COUNT: CPT

## 2022-08-28 LAB
BACTERIA UR CULT: NORMAL
SIGNIFICANT IND 70042: NORMAL
SITE SITE: NORMAL
SOURCE SOURCE: NORMAL

## 2022-08-29 ENCOUNTER — HOSPITAL ENCOUNTER (OUTPATIENT)
Dept: RADIOLOGY | Facility: MEDICAL CENTER | Age: 52
End: 2022-08-29
Attending: NURSE PRACTITIONER

## 2022-08-29 ENCOUNTER — OFFICE VISIT (OUTPATIENT)
Dept: URGENT CARE | Facility: PHYSICIAN GROUP | Age: 52
End: 2022-08-29
Payer: COMMERCIAL

## 2022-08-29 ENCOUNTER — HOSPITAL ENCOUNTER (OUTPATIENT)
Facility: MEDICAL CENTER | Age: 52
End: 2022-08-29
Attending: NURSE PRACTITIONER

## 2022-08-29 VITALS
WEIGHT: 204 LBS | SYSTOLIC BLOOD PRESSURE: 146 MMHG | BODY MASS INDEX: 26.18 KG/M2 | DIASTOLIC BLOOD PRESSURE: 102 MMHG | OXYGEN SATURATION: 98 % | HEART RATE: 82 BPM | HEIGHT: 74 IN | RESPIRATION RATE: 16 BRPM | TEMPERATURE: 97.3 F

## 2022-08-29 DIAGNOSIS — N50.812 PAIN IN LEFT TESTICLE: ICD-10-CM

## 2022-08-29 DIAGNOSIS — N50.89 SWELLING OF LEFT TESTICLE: ICD-10-CM

## 2022-08-29 DIAGNOSIS — N43.3 HYDROCELE IN ADULT: ICD-10-CM

## 2022-08-29 DIAGNOSIS — R30.0 DYSURIA: ICD-10-CM

## 2022-08-29 DIAGNOSIS — R03.0 ELEVATED BLOOD PRESSURE READING: ICD-10-CM

## 2022-08-29 DIAGNOSIS — I86.1 VARICOCELE: ICD-10-CM

## 2022-08-29 LAB
APPEARANCE UR: CLEAR
BILIRUB UR STRIP-MCNC: NEGATIVE MG/DL
COLOR UR AUTO: YELLOW
GLUCOSE UR STRIP.AUTO-MCNC: NEGATIVE MG/DL
KETONES UR STRIP.AUTO-MCNC: NEGATIVE MG/DL
LEUKOCYTE ESTERASE UR QL STRIP.AUTO: NEGATIVE
NITRITE UR QL STRIP.AUTO: NEGATIVE
PH UR STRIP.AUTO: 5.5 [PH] (ref 5–8)
PROT UR QL STRIP: NEGATIVE MG/DL
RBC UR QL AUTO: NEGATIVE
SP GR UR STRIP.AUTO: 1.01
UROBILINOGEN UR STRIP-MCNC: NEGATIVE MG/DL

## 2022-08-29 PROCEDURE — 76870 US EXAM SCROTUM: CPT

## 2022-08-29 PROCEDURE — 99214 OFFICE O/P EST MOD 30 MIN: CPT | Performed by: NURSE PRACTITIONER

## 2022-08-29 PROCEDURE — 87591 N.GONORRHOEAE DNA AMP PROB: CPT

## 2022-08-29 PROCEDURE — 87491 CHLMYD TRACH DNA AMP PROBE: CPT

## 2022-08-29 PROCEDURE — 81002 URINALYSIS NONAUTO W/O SCOPE: CPT | Performed by: NURSE PRACTITIONER

## 2022-08-29 ASSESSMENT — FIBROSIS 4 INDEX: FIB4 SCORE: 0.88

## 2022-08-29 NOTE — PROGRESS NOTES
"Subjective:   Arcelia Umanzor is a 52 y.o. male who presents for Rash (On arms ) and Sexually Transmitted Diseases       HPI  Patient presents for evaluation of several day history of left testicle swelling, pain, and feeling of warmth.  Patient was seen via telehealth, given Bactrim for possible epididymitis, states that his dysuria and frequency has improved, however is concerned about continued left testicle swelling.  Is worse with sitting, as well as certain motions as far as getting in and out of his truck.    Additionally, patient has noticed rash on both of his bilateral elbows and forearms over the past 24 hours.  States that he often is covered in coal dust and dust, thinks may be an allergic reaction.  Denies cough, wheezing, shortness of breath.  He has not tried thing yet for his symptoms.  Denies any other additional exposures at this time.    ROS  All other systems are negative except as documented above within HPI.    MEDS:   Current Outpatient Medications:     dicyclomine (BENTYL) 20 MG Tab, Take 1 tablet by mouth every 6 hours. (Patient not taking: Reported on 8/29/2022), Disp: 60 tablet, Rfl: 2  ALLERGIES: No Known Allergies    Patient's PMH, SocHx, SurgHx, FamHx, Drug allergies and medications were reviewed.     Objective:   BP (!) 146/102   Pulse 82   Temp 36.3 °C (97.3 °F) (Temporal)   Resp 16   Ht 1.88 m (6' 2\")   Wt 92.5 kg (204 lb)   SpO2 98%   BMI 26.19 kg/m²     Physical Exam  Vitals and nursing note reviewed.   Constitutional:       General: He is awake.      Appearance: Normal appearance. He is well-developed.   HENT:      Head: Normocephalic and atraumatic.      Right Ear: External ear normal.      Left Ear: External ear normal.      Nose: Nose normal.      Mouth/Throat:      Lips: Pink.      Mouth: Mucous membranes are moist.      Pharynx: Oropharynx is clear.   Eyes:      General: Lids are normal.      Extraocular Movements: Extraocular movements intact.      Conjunctiva/sclera: " Conjunctivae normal.   Cardiovascular:      Rate and Rhythm: Normal rate and regular rhythm.   Pulmonary:      Effort: Pulmonary effort is normal.   Musculoskeletal:         General: Normal range of motion.      Cervical back: Normal range of motion.   Skin:     General: Skin is warm and dry.   Neurological:      Mental Status: He is alert and oriented to person, place, and time.   Psychiatric:         Mood and Affect: Mood normal.         Behavior: Behavior normal. Behavior is cooperative.         Thought Content: Thought content normal.         Judgment: Judgment normal.       [id number]    Result Date: 8/29/2022 8/29/2022 5:56 PM HISTORY/REASON FOR EXAM: Pain. TECHNIQUE/EXAM DESCRIPTION: Real-time sonography of the scrotum was performed with gray-scale, color and duplex Doppler imaging. COMPARISON: None FINDINGS: The testes are homogeneous in echotexture and low-resistive waveforms are confirmed bilaterally. No intratesticular mass is seen. Vascular flow is symmetric. The right testis measures 4.15 cm x 2.07 cm x 3.25 cm. The left testis measures  3.73 cm x 1.78 cm x 3.14 cm. The epididymides are within normal limits. Small left hydrocele. Small left varicocele.     1.  Normal testicles. 2.  Small left hydrocele. 3.  Small left varicocele.       Assessment/Plan:   Assessment    1. Varicocele  - Referral to Urology    2. Hydrocele in adult  - Referral to Urology    3. Pain in left testicle  - POCT Urinalysis  - Chlamydia/GC, PCR (Urine); Future  - [id number]; Future  - Referral to Urology    4. Swelling of left testicle  - [id number]; Future  - Referral to Urology    5. Dysuria  - POCT Urinalysis  - Chlamydia/GC, PCR (Urine); Future    6. Elevated blood pressure reading      Vital signs stable at today's acute urgent care visit.  Review of any test results completed in clinic.  Ordered US of scrotum, call patient with results post visit.  Will refer to urology.    Advised the patient to  follow-up with the primary care provider/urgent care if symptoms persist.  Recommend close monitoring of elevated blood pressure.  Red flags discussed and indications to immediately call 911 or present to the ED. All questions were encouraged and answered to the patient's satisfaction and understanding, and they agree to the plan of care.     This is an acute problem with uncertain prognosis, medication management and instructions as well as management options were provided.  I personally reviewed prior external notes and test results pertinent to today and independently reviewed and interpreted all diagnostics. Time spent evaluating this patient includes preparing for visit, counseling/education, exam, evaluation, obtaining history, and ordering lab/test/procedures.      Please note that this dictation was created using voice recognition software. I have made a reasonable attempt to correct obvious errors, but I expect that there are errors of grammar and possibly content that I did not discover before finalizing the note.

## 2022-08-30 LAB
C TRACH DNA SPEC QL NAA+PROBE: NEGATIVE
N GONORRHOEA DNA SPEC QL NAA+PROBE: NEGATIVE
SPECIMEN SOURCE: NORMAL

## 2023-01-23 ENCOUNTER — HOSPITAL ENCOUNTER (OUTPATIENT)
Dept: LAB | Facility: MEDICAL CENTER | Age: 53
End: 2023-01-23
Attending: PHYSICIAN ASSISTANT
Payer: COMMERCIAL

## 2023-01-23 ENCOUNTER — OFFICE VISIT (OUTPATIENT)
Dept: MEDICAL GROUP | Facility: CLINIC | Age: 53
End: 2023-01-23
Payer: COMMERCIAL

## 2023-01-23 VITALS
BODY MASS INDEX: 27.7 KG/M2 | OXYGEN SATURATION: 99 % | HEART RATE: 84 BPM | DIASTOLIC BLOOD PRESSURE: 82 MMHG | RESPIRATION RATE: 16 BRPM | HEIGHT: 73 IN | TEMPERATURE: 97.4 F | SYSTOLIC BLOOD PRESSURE: 126 MMHG | WEIGHT: 209 LBS

## 2023-01-23 DIAGNOSIS — K58.2 IRRITABLE BOWEL SYNDROME WITH BOTH CONSTIPATION AND DIARRHEA: ICD-10-CM

## 2023-01-23 DIAGNOSIS — Z13.21 ENCOUNTER FOR VITAMIN DEFICIENCY SCREENING: ICD-10-CM

## 2023-01-23 DIAGNOSIS — K76.0 FATTY LIVER: ICD-10-CM

## 2023-01-23 DIAGNOSIS — Z11.59 NEED FOR HEPATITIS C SCREENING TEST: ICD-10-CM

## 2023-01-23 DIAGNOSIS — I86.1 VARICOCELE: ICD-10-CM

## 2023-01-23 DIAGNOSIS — Z12.5 PROSTATE CANCER SCREENING: ICD-10-CM

## 2023-01-23 DIAGNOSIS — Z12.12 SCREENING FOR COLORECTAL CANCER: ICD-10-CM

## 2023-01-23 DIAGNOSIS — Z12.11 SCREENING FOR COLORECTAL CANCER: ICD-10-CM

## 2023-01-23 DIAGNOSIS — N43.3 HYDROCELE IN ADULT: ICD-10-CM

## 2023-01-23 LAB
25(OH)D3 SERPL-MCNC: 29 NG/ML (ref 30–100)
ALBUMIN SERPL BCP-MCNC: 4.6 G/DL (ref 3.2–4.9)
ALBUMIN/GLOB SERPL: 1.9 G/DL
ALP SERPL-CCNC: 79 U/L (ref 30–99)
ALT SERPL-CCNC: 19 U/L (ref 2–50)
ANION GAP SERPL CALC-SCNC: 9 MMOL/L (ref 7–16)
AST SERPL-CCNC: 18 U/L (ref 12–45)
BILIRUB SERPL-MCNC: 0.4 MG/DL (ref 0.1–1.5)
BUN SERPL-MCNC: 15 MG/DL (ref 8–22)
CALCIUM ALBUM COR SERPL-MCNC: 9 MG/DL (ref 8.5–10.5)
CALCIUM SERPL-MCNC: 9.5 MG/DL (ref 8.5–10.5)
CHLORIDE SERPL-SCNC: 103 MMOL/L (ref 96–112)
CHOLEST SERPL-MCNC: 203 MG/DL (ref 100–199)
CO2 SERPL-SCNC: 28 MMOL/L (ref 20–33)
CREAT SERPL-MCNC: 0.93 MG/DL (ref 0.5–1.4)
FASTING STATUS PATIENT QL REPORTED: NORMAL
GFR SERPLBLD CREATININE-BSD FMLA CKD-EPI: 100 ML/MIN/1.73 M 2
GLOBULIN SER CALC-MCNC: 2.4 G/DL (ref 1.9–3.5)
GLUCOSE SERPL-MCNC: 122 MG/DL (ref 65–99)
HCV AB SER QL: NORMAL
HDLC SERPL-MCNC: 45 MG/DL
LDLC SERPL CALC-MCNC: 128 MG/DL
POTASSIUM SERPL-SCNC: 4.7 MMOL/L (ref 3.6–5.5)
PROT SERPL-MCNC: 7 G/DL (ref 6–8.2)
PSA SERPL-MCNC: 1.03 NG/ML (ref 0–4)
SODIUM SERPL-SCNC: 140 MMOL/L (ref 135–145)
TRIGL SERPL-MCNC: 149 MG/DL (ref 0–149)
TSH SERPL DL<=0.005 MIU/L-ACNC: 1.05 UIU/ML (ref 0.38–5.33)

## 2023-01-23 PROCEDURE — 82306 VITAMIN D 25 HYDROXY: CPT

## 2023-01-23 PROCEDURE — 84443 ASSAY THYROID STIM HORMONE: CPT

## 2023-01-23 PROCEDURE — 86803 HEPATITIS C AB TEST: CPT

## 2023-01-23 PROCEDURE — 80061 LIPID PANEL: CPT

## 2023-01-23 PROCEDURE — 84153 ASSAY OF PSA TOTAL: CPT

## 2023-01-23 PROCEDURE — 80053 COMPREHEN METABOLIC PANEL: CPT

## 2023-01-23 PROCEDURE — 99214 OFFICE O/P EST MOD 30 MIN: CPT | Performed by: PHYSICIAN ASSISTANT

## 2023-01-23 PROCEDURE — 36415 COLL VENOUS BLD VENIPUNCTURE: CPT

## 2023-01-23 RX ORDER — DICYCLOMINE HYDROCHLORIDE 10 MG/1
10 CAPSULE ORAL
Qty: 60 CAPSULE | Refills: 1 | Status: SHIPPED | OUTPATIENT
Start: 2023-01-23 | End: 2023-05-09

## 2023-01-23 ASSESSMENT — PATIENT HEALTH QUESTIONNAIRE - PHQ9
5. POOR APPETITE OR OVEREATING: NOT AT ALL
1. LITTLE INTEREST OR PLEASURE IN DOING THINGS: NOT AT ALL
4. FEELING TIRED OR HAVING LITTLE ENERGY: NOT AT ALL
6. FEELING BAD ABOUT YOURSELF - OR THAT YOU ARE A FAILURE OR HAVE LET YOURSELF OR YOUR FAMILY DOWN: NOT AL ALL
7. TROUBLE CONCENTRATING ON THINGS, SUCH AS READING THE NEWSPAPER OR WATCHING TELEVISION: NOT AT ALL
8. MOVING OR SPEAKING SO SLOWLY THAT OTHER PEOPLE COULD HAVE NOTICED. OR THE OPPOSITE, BEING SO FIGETY OR RESTLESS THAT YOU HAVE BEEN MOVING AROUND A LOT MORE THAN USUAL: NOT AT ALL
9. THOUGHTS THAT YOU WOULD BE BETTER OFF DEAD, OR OF HURTING YOURSELF: NOT AT ALL
2. FEELING DOWN, DEPRESSED, IRRITABLE, OR HOPELESS: NOT AT ALL
3. TROUBLE FALLING OR STAYING ASLEEP OR SLEEPING TOO MUCH: NOT AT ALL
SUM OF ALL RESPONSES TO PHQ9 QUESTIONS 1 AND 2: 0
SUM OF ALL RESPONSES TO PHQ QUESTIONS 1-9: 0

## 2023-01-23 NOTE — PROGRESS NOTES
"cc:  request testing    Subjective:     Arcelia Umanzor is a 52 y.o. male presenting for request testing      Patient presents to the office for his abdomen creating noises.  He states his symptoms improved and then the symptoms returned.  He states that it is worse with weight gain.  He is concerned as he does have some past opioid use.  He is concerned that this may be contributing to his symptoms at this time.    He states that he has been told he has a hydrocele which has bene painful for him.  Patient had an ultrasound of the scrotum done in August 2022.  He was seen in the urgent care for this.  Patient states that he is still having pain in the left scrotum.  He states that he is a  and does a great deal of sitting.      Patient is also due for routine screenings.  He would like to have those ordered at this time.    Review of systems:  See above.   Denies any symptoms unless previously indicated.        Current Outpatient Medications:     dicyclomine (BENTYL) 10 MG Cap, Take 1 Capsule by mouth 2 (two) times a day., Disp: 60 Capsule, Rfl: 1    Allergies, past medical history, past surgical history, family history, social history reviewed and updated    Objective:     Vitals: /82 (BP Location: Left arm, Patient Position: Sitting, BP Cuff Size: Large adult)   Pulse 84   Temp 36.3 °C (97.4 °F) (Temporal)   Resp 16   Ht 1.854 m (6' 1\")   Wt 94.8 kg (209 lb)   SpO2 99%   BMI 27.57 kg/m²   General: Alert, pleasant, NAD  EYES:   PERRL, EOMI, no icterus or pallor.  Conjunctivae and lids normal.   HENT:  Normocephalic.  External ears normal.  Neck supple.     Respiratory: Normal respiratory effort.    Abdomen: Not obese  Skin: Warm, dry, no rashes.  Musculoskeletal: Gait is normal.  Moves all extremities well.    Extremities: normal range of motion all extremities.   Neurological: No tremors, sensation grossly intact,  gait is normal, CN2-12 intact.  Psych:  Affect/mood is normal, judgement " is good, memory is intact, grooming is appropriate.    Assessment/Plan:     Arcelia was seen today for requesting labs and referral needed.    Diagnoses and all orders for this visit:    Irritable bowel syndrome with both constipation and diarrhea  -     TSH WITH REFLEX TO FT4; Future  -     dicyclomine (BENTYL) 10 MG Cap; Take 1 Capsule by mouth 2 (two) times a day.  -     Referral to Gastroenterology      We will try patient on dicyclomine twice a day.  He has had concerns with this medication in the past.  We will follow-up in 2 to 4 weeks.  We will also submit a referral to GI.    Fatty liver  -     Lipid Profile; Future  -     Comp Metabolic Panel; Future    Lab work is been ordered and we will evaluate further.    Varicocele  -     Referral to Urology  Hydrocele in adult  -     Referral to Urology    Referral to urology submitted at this time.    Prostate cancer screening  -     PROSTATE SPECIFIC AG SCREENING; Future  Need for hepatitis C screening test  -     HEP C VIRUS ANTIBODY; Future  Encounter for vitamin deficiency screening  -     VITAMIN D,25 HYDROXY (DEFICIENCY); Future  Screening for colorectal cancer  -     COLOGUARD (FIT DNA)    Routine screenings ordered.  Follow-up in 2 to 4 weeks with test results.        Return in about 4 weeks (around 2/20/2023), or if symptoms worsen or fail to improve, for 2-4 weeks.    Please note that this dictation was created using voice recognition software. I have made every reasonable attempt to correct obvious errors, but expect that there are errors of grammar and possible content that I did not discover before finalizing note.

## 2023-03-30 ENCOUNTER — HOSPITAL ENCOUNTER (OUTPATIENT)
Dept: LAB | Facility: MEDICAL CENTER | Age: 53
End: 2023-03-30
Attending: PHYSICIAN ASSISTANT
Payer: COMMERCIAL

## 2023-03-30 ENCOUNTER — OFFICE VISIT (OUTPATIENT)
Dept: MEDICAL GROUP | Facility: CLINIC | Age: 53
End: 2023-03-30
Payer: COMMERCIAL

## 2023-03-30 VITALS
WEIGHT: 211.8 LBS | HEART RATE: 77 BPM | SYSTOLIC BLOOD PRESSURE: 120 MMHG | DIASTOLIC BLOOD PRESSURE: 74 MMHG | TEMPERATURE: 98 F | HEIGHT: 72 IN | BODY MASS INDEX: 28.69 KG/M2 | RESPIRATION RATE: 20 BRPM | OXYGEN SATURATION: 99 %

## 2023-03-30 DIAGNOSIS — K62.89 RECTAL PAIN: ICD-10-CM

## 2023-03-30 DIAGNOSIS — K58.2 IRRITABLE BOWEL SYNDROME WITH BOTH CONSTIPATION AND DIARRHEA: ICD-10-CM

## 2023-03-30 DIAGNOSIS — R79.89 LOW VITAMIN D LEVEL: ICD-10-CM

## 2023-03-30 DIAGNOSIS — N50.89 TESTICULAR LUMP: ICD-10-CM

## 2023-03-30 DIAGNOSIS — E78.49 OTHER HYPERLIPIDEMIA: ICD-10-CM

## 2023-03-30 DIAGNOSIS — K62.5 RECTAL BLEEDING: ICD-10-CM

## 2023-03-30 DIAGNOSIS — K31.9 DYSPEPSIA AND DISORDER OF FUNCTION OF STOMACH: ICD-10-CM

## 2023-03-30 DIAGNOSIS — R10.13 DYSPEPSIA AND DISORDER OF FUNCTION OF STOMACH: ICD-10-CM

## 2023-03-30 DIAGNOSIS — R73.9 HYPERGLYCEMIA: ICD-10-CM

## 2023-03-30 DIAGNOSIS — Z23 NEED FOR VACCINATION: ICD-10-CM

## 2023-03-30 DIAGNOSIS — K60.2 ANAL FISSURE: ICD-10-CM

## 2023-03-30 DIAGNOSIS — G44.209 TENSION-TYPE HEADACHE, NOT INTRACTABLE, UNSPECIFIED CHRONICITY PATTERN: ICD-10-CM

## 2023-03-30 DIAGNOSIS — K27.9 PEPTIC ULCER: ICD-10-CM

## 2023-03-30 LAB
B-HCG SERPL-ACNC: <1 MIU/ML (ref 0–5)
LDH SERPL L TO P-CCNC: 144 U/L (ref 107–266)

## 2023-03-30 PROCEDURE — 82105 ALPHA-FETOPROTEIN SERUM: CPT

## 2023-03-30 PROCEDURE — 90471 IMMUNIZATION ADMIN: CPT | Performed by: PHYSICIAN ASSISTANT

## 2023-03-30 PROCEDURE — 99214 OFFICE O/P EST MOD 30 MIN: CPT | Mod: 25 | Performed by: PHYSICIAN ASSISTANT

## 2023-03-30 PROCEDURE — 83615 LACTATE (LD) (LDH) ENZYME: CPT

## 2023-03-30 PROCEDURE — 90746 HEPB VACCINE 3 DOSE ADULT IM: CPT | Performed by: PHYSICIAN ASSISTANT

## 2023-03-30 PROCEDURE — 84702 CHORIONIC GONADOTROPIN TEST: CPT

## 2023-03-30 PROCEDURE — 36415 COLL VENOUS BLD VENIPUNCTURE: CPT

## 2023-03-30 RX ORDER — BACLOFEN 10 MG/1
10 TABLET ORAL 2 TIMES DAILY
Qty: 60 TABLET | Refills: 0 | Status: SHIPPED | OUTPATIENT
Start: 2023-03-30 | End: 2023-05-27

## 2023-03-30 RX ORDER — HYDROCORTISONE ACETATE 25 MG/1
25 SUPPOSITORY RECTAL 2 TIMES DAILY PRN
Qty: 60 SUPPOSITORY | Refills: 1 | Status: SHIPPED | OUTPATIENT
Start: 2023-03-30 | End: 2023-05-27

## 2023-03-30 NOTE — PROGRESS NOTES
cc:  headache    Subjective:     Arcelia Umanzor is a 53 y.o. male presenting for headache      Patient presents to the office for headache.  Patient states that he has headaches with tailbone pain.  He states that it comes on after he passes stool.  He states that he did have some bleeding.  He states that he does have an appointment but it is in may.  He has been trying hemorrhoid cream and suppositories.  He states that he has seen more blood as a result.  He also admits to previous use of meth.  He states that he has also had a colonoscopy but had to be seen in the ER soon after as he had bleeding.  His last colonoscopy was 5 years ago. He denies a family history of crohns and UC. She has a sister age 60 and has been diagnosed with rectal cancer.  He states that he was diagnosed with duodenal ulcers.    Patient is also complaining of rectal pain.  He was referred to gastroenterology in January    Patient is also complaining of a lump of his right testicle.  He did have an ultrasound 8- which showed a normal testicle but a small left hydrocele and a small left varicocele.  He was referred to urology in January.  Patient states that he was seen by urology for the left side but not the right side.  He states that it is bothersome as he is a .  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      Patient did have lab work drawn before coming in to be seen today.  Labs do show hyperglycemia, elevated cholesterol levels and low vitamin D levels.    Review of systems:  See above.   Denies any symptoms unless previously indicated.        Current Outpatient Medications:     hydrocortisone (ANUSOL-HC) 25 MG Suppos, Insert 1 Suppository into the rectum 2 times a day as needed (pain)., Disp: 60 Suppository, Rfl: 1    baclofen (LIORESAL) 10 MG Tab, Take 1 Tablet by mouth 2 times a day., Disp: 60 Tablet, Rfl: 0    dicyclomine (BENTYL) 10 MG Cap, Take 1 Capsule  by mouth 2 (two) times a day. (Patient not taking: Reported on 3/30/2023), Disp: 60 Capsule, Rfl: 1    Allergies, past medical history, past surgical history, family history, social history reviewed and updated    Objective:     Vitals: /74 (BP Location: Left arm, Patient Position: Sitting, BP Cuff Size: Adult)   Pulse 77   Temp 36.7 °C (98 °F) (Temporal)   Resp 20   Ht 1.829 m (6')   Wt 96.1 kg (211 lb 12.8 oz)   SpO2 99%   BMI 28.73 kg/m²   General: Alert, pleasant, NAD  EYES:   PERRL, EOMI, no icterus or pallor.  Conjunctivae and lids normal.   HENT:  Normocephalic.  External ears normal.  Neck supple.     Respiratory: Normal respiratory effort.    Abdomen: Not obese  Skin: Warm, dry, no rashes.  Musculoskeletal: Gait is normal.  Moves all extremities well.    Extremities: normal range of motion all extremities.   :  palpable abnormality right testicle.  Possibly in the skin. Cannot exclude testicle.  Jazzmine REINA in room for exam.   Neurological: No tremors, sensation grossly  gait is normal, CN2-12 intact.  Psych:  Affect/mood is normal, judgement is good, memory is intact, grooming is appropriate.    Assessment/Plan:     Arcelia was seen today for headache and bump.    Diagnoses and all orders for this visit:    Rectal bleeding  -     Referral to Gastroenterology  -     hydrocortisone (ANUSOL-HC) 25 MG Suppos; Insert 1 Suppository into the rectum 2 times a day as needed (pain).  -     DX-LUMBAR SPINE-2 OR 3 VIEWS; Future  Rectal pain  -     Referral to Gastroenterology  -     hydrocortisone (ANUSOL-HC) 25 MG Suppos; Insert 1 Suppository into the rectum 2 times a day as needed (pain).  -     DX-LUMBAR SPINE-2 OR 3 VIEWS; Future  Anal fissure  -     Referral to Gastroenterology  -     hydrocortisone (ANUSOL-HC) 25 MG Suppos; Insert 1 Suppository into the rectum 2 times a day as needed (pain).  -     DX-LUMBAR SPINE-2 OR 3 VIEWS; Future  Dyspepsia and disorder of function of stomach  -     Referral to  Gastroenterology  Irritable bowel syndrome with both constipation and diarrhea  -     Referral to Gastroenterology  Peptic ulcer  -     Referral to Gastroenterology    An urgent referral has been sent to gastroenterology as symptoms are worsening.  We will try patient on an Anusol suppository and obtain x-rays to evaluate further of the lumbar spine.  Unable to explain why rectal pain causes the headache.    Testicular lump  -     AFP SERUM TUMOR MARKER; Future  -     LDH; Future  -     HCG QUANTITATIVE; Future  -     [id number]; Future    Seems like this may be within the skin but cannot exclude the testicle for sure.  Therefore we will obtain labs and an ultrasound.  Patient has a follow-up with urology.    Tension-type headache, not intractable, unspecified chronicity pattern  -     baclofen (LIORESAL) 10 MG Tab; Take 1 Tablet by mouth 2 times a day.    Pain may cause muscle spasming causing tension headache.  We will try patient on baclofen.  Side effects and use of medication discussed in detail with patient.    Other hyperlipidemia  Hyperglycemia  Low vitamin D level    Labs discussed with patient.  Recommend following up in 6 months.    Need for vaccination  -     Hepatitis B Vaccine Adult 20+    Vaccine given today.        Return in about 4 weeks (around 4/27/2023), or if symptoms worsen or fail to improve, for 2-4 weeks.    Please note that this dictation was created using voice recognition software. I have made every reasonable attempt to correct obvious errors, but expect that there are errors of grammar and possible content that I did not discover before finalizing note.

## 2023-04-01 LAB — AFP-TM SERPL-MCNC: 1 NG/ML (ref 0–9)

## 2023-04-27 ENCOUNTER — APPOINTMENT (OUTPATIENT)
Dept: RADIOLOGY | Facility: MEDICAL CENTER | Age: 53
End: 2023-04-27
Attending: PHYSICIAN ASSISTANT
Payer: COMMERCIAL

## 2023-04-27 DIAGNOSIS — K62.89 RECTAL PAIN: ICD-10-CM

## 2023-04-27 DIAGNOSIS — K60.2 ANAL FISSURE: ICD-10-CM

## 2023-04-27 DIAGNOSIS — K62.5 RECTAL BLEEDING: ICD-10-CM

## 2023-04-27 PROCEDURE — 72100 X-RAY EXAM L-S SPINE 2/3 VWS: CPT

## 2023-05-09 ENCOUNTER — OFFICE VISIT (OUTPATIENT)
Dept: URGENT CARE | Facility: PHYSICIAN GROUP | Age: 53
End: 2023-05-09
Payer: COMMERCIAL

## 2023-05-09 VITALS
SYSTOLIC BLOOD PRESSURE: 124 MMHG | BODY MASS INDEX: 27.72 KG/M2 | TEMPERATURE: 97.1 F | WEIGHT: 216 LBS | DIASTOLIC BLOOD PRESSURE: 64 MMHG | HEART RATE: 84 BPM | OXYGEN SATURATION: 100 % | RESPIRATION RATE: 16 BRPM | HEIGHT: 74 IN

## 2023-05-09 DIAGNOSIS — J01.00 ACUTE NON-RECURRENT MAXILLARY SINUSITIS: ICD-10-CM

## 2023-05-09 DIAGNOSIS — K21.9 GASTROESOPHAGEAL REFLUX DISEASE, UNSPECIFIED WHETHER ESOPHAGITIS PRESENT: ICD-10-CM

## 2023-05-09 DIAGNOSIS — M54.2 NECK PAIN: ICD-10-CM

## 2023-05-09 DIAGNOSIS — M25.511 ACUTE PAIN OF RIGHT SHOULDER: ICD-10-CM

## 2023-05-09 PROCEDURE — 99214 OFFICE O/P EST MOD 30 MIN: CPT | Performed by: PHYSICIAN ASSISTANT

## 2023-05-09 RX ORDER — DOXYCYCLINE HYCLATE 100 MG
100 TABLET ORAL 2 TIMES DAILY
Qty: 14 TABLET | Refills: 0 | Status: SHIPPED | OUTPATIENT
Start: 2023-05-09 | End: 2023-05-16

## 2023-05-09 RX ORDER — METHYLPREDNISOLONE 4 MG/1
TABLET ORAL
Qty: 21 TABLET | Refills: 0 | Status: SHIPPED | OUTPATIENT
Start: 2023-05-09 | End: 2023-05-27

## 2023-05-09 RX ORDER — OMEPRAZOLE 20 MG/1
20 CAPSULE, DELAYED RELEASE ORAL DAILY
Qty: 14 CAPSULE | Refills: 0 | Status: SHIPPED | OUTPATIENT
Start: 2023-05-09 | End: 2023-05-27

## 2023-05-09 ASSESSMENT — ENCOUNTER SYMPTOMS
SPEECH CHANGE: 0
NAUSEA: 0
WEAKNESS: 0
HEADACHES: 0
CHILLS: 0
BLOOD IN STOOL: 0
NECK PAIN: 1
BLURRED VISION: 0
VOMITING: 0
ABDOMINAL PAIN: 0
HEARTBURN: 1
CONSTIPATION: 0
PALPITATIONS: 0
DIZZINESS: 0
SENSORY CHANGE: 0
DIARRHEA: 0
FOCAL WEAKNESS: 0
SINUS PAIN: 1
FEVER: 0
DOUBLE VISION: 0
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
## 2023-05-27 ENCOUNTER — OFFICE VISIT (OUTPATIENT)
Dept: URGENT CARE | Facility: PHYSICIAN GROUP | Age: 53
End: 2023-05-27
Payer: COMMERCIAL

## 2023-05-27 VITALS
OXYGEN SATURATION: 98 % | SYSTOLIC BLOOD PRESSURE: 142 MMHG | HEART RATE: 76 BPM | TEMPERATURE: 97.6 F | DIASTOLIC BLOOD PRESSURE: 86 MMHG | RESPIRATION RATE: 14 BRPM | WEIGHT: 212 LBS | BODY MASS INDEX: 27.21 KG/M2 | HEIGHT: 74 IN

## 2023-05-27 DIAGNOSIS — R03.0 ELEVATED BLOOD PRESSURE READING: ICD-10-CM

## 2023-05-27 DIAGNOSIS — M54.2 CERVICAL PAIN (NECK): ICD-10-CM

## 2023-05-27 PROCEDURE — 3079F DIAST BP 80-89 MM HG: CPT | Performed by: STUDENT IN AN ORGANIZED HEALTH CARE EDUCATION/TRAINING PROGRAM

## 2023-05-27 PROCEDURE — 99213 OFFICE O/P EST LOW 20 MIN: CPT | Performed by: STUDENT IN AN ORGANIZED HEALTH CARE EDUCATION/TRAINING PROGRAM

## 2023-05-27 PROCEDURE — 3077F SYST BP >= 140 MM HG: CPT | Performed by: STUDENT IN AN ORGANIZED HEALTH CARE EDUCATION/TRAINING PROGRAM

## 2023-05-27 RX ORDER — COLLAGENASE CLOSTRIDIUM HISTOLYTICUM 0.9 MG
KIT INJECTION
COMMUNITY
Start: 2023-05-25 | End: 2023-05-27

## 2023-05-27 RX ORDER — KETOROLAC TROMETHAMINE 30 MG/ML
30 INJECTION, SOLUTION INTRAMUSCULAR; INTRAVENOUS ONCE
Status: COMPLETED | OUTPATIENT
Start: 2023-05-27 | End: 2023-05-27

## 2023-05-27 RX ADMIN — KETOROLAC TROMETHAMINE 30 MG: 30 INJECTION, SOLUTION INTRAMUSCULAR; INTRAVENOUS at 14:17
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
## 2023-05-31 ENCOUNTER — OFFICE VISIT (OUTPATIENT)
Dept: MEDICAL GROUP | Facility: CLINIC | Age: 53
End: 2023-05-31
Payer: COMMERCIAL

## 2023-05-31 VITALS
BODY MASS INDEX: 28.02 KG/M2 | HEART RATE: 75 BPM | DIASTOLIC BLOOD PRESSURE: 90 MMHG | RESPIRATION RATE: 18 BRPM | OXYGEN SATURATION: 99 % | WEIGHT: 211.4 LBS | SYSTOLIC BLOOD PRESSURE: 122 MMHG | TEMPERATURE: 97.3 F | HEIGHT: 73 IN

## 2023-05-31 DIAGNOSIS — K21.9 GASTROESOPHAGEAL REFLUX DISEASE WITHOUT ESOPHAGITIS: ICD-10-CM

## 2023-05-31 DIAGNOSIS — M54.2 NECK PAIN: ICD-10-CM

## 2023-05-31 DIAGNOSIS — R68.84 JAW PAIN: ICD-10-CM

## 2023-05-31 DIAGNOSIS — R51.9 INTRACTABLE HEADACHE, UNSPECIFIED CHRONICITY PATTERN, UNSPECIFIED HEADACHE TYPE: ICD-10-CM

## 2023-05-31 DIAGNOSIS — M25.511 ACUTE PAIN OF RIGHT SHOULDER: ICD-10-CM

## 2023-05-31 DIAGNOSIS — R73.9 HYPERGLYCEMIA: ICD-10-CM

## 2023-05-31 DIAGNOSIS — R20.2 PARESTHESIA: ICD-10-CM

## 2023-05-31 PROCEDURE — 99214 OFFICE O/P EST MOD 30 MIN: CPT | Performed by: PHYSICIAN ASSISTANT

## 2023-05-31 PROCEDURE — 93000 ELECTROCARDIOGRAM COMPLETE: CPT | Performed by: PHYSICIAN ASSISTANT

## 2023-05-31 PROCEDURE — 3080F DIAST BP >= 90 MM HG: CPT | Performed by: PHYSICIAN ASSISTANT

## 2023-05-31 PROCEDURE — 3074F SYST BP LT 130 MM HG: CPT | Performed by: PHYSICIAN ASSISTANT

## 2023-05-31 RX ORDER — OMEPRAZOLE 40 MG/1
40 CAPSULE, DELAYED RELEASE ORAL DAILY
Qty: 90 CAPSULE | Refills: 1 | Status: SHIPPED | OUTPATIENT
Start: 2023-05-31

## 2023-05-31 RX ORDER — CYCLOBENZAPRINE HCL 10 MG
10 TABLET ORAL 2 TIMES DAILY PRN
Qty: 60 TABLET | Refills: 1 | Status: SHIPPED | OUTPATIENT
Start: 2023-05-31

## 2023-05-31 RX ORDER — METHYLPREDNISOLONE 4 MG/1
TABLET ORAL
COMMUNITY

## 2023-05-31 NOTE — PROGRESS NOTES
"cc:  jaw,shoulder, axilla pain    Subjective:     Arcelia Umanzor is a 53 y.o. male presenting for jaw,shoulder, axilla pain      Patient presents to the office for jaw,shoulder, axilla pain.  Patient states that he is having numbness on the right side of his face.  He states that he was having right sided numbness and now it is on the left side.   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  He states that he has been working 22 days straight.  He states that his blood pressure has been elevated.  He states that he has been to the urgent care twice in the last several weeks.  Patient states that he has also been having acid reflux symptoms and has also been having a dry cough.  Symptoms improved when he started the medication.  He has an appointment June 28th with GI.      Review of systems:  See above.   Denies any symptoms unless previously indicated.        Current Outpatient Medications:     cyclobenzaprine (FLEXERIL) 10 mg Tab, Take 1 Tablet by mouth 2 times a day as needed for Moderate Pain., Disp: 60 Tablet, Rfl: 1    omeprazole (PRILOSEC) 40 MG delayed-release capsule, Take 1 Capsule by mouth every day., Disp: 90 Capsule, Rfl: 1    diclofenac sodium (VOLTAREN) 1 % Gel, Apply 2 g topically 4 times a day as needed (pain)., Disp: 100 g, Rfl: 0    methylPREDNISolone (MEDROL DOSEPAK) 4 MG Tablet Therapy Pack, methylprednisolone 4 mg tablets in a dose pack  TAKE BY MOUTH AS DIRECTED ON INSIDE OF PACKAGE (Patient not taking: Reported on 5/31/2023), Disp: , Rfl:     Allergies, past medical history, past surgical history, family history, social history reviewed and updated    Objective:     Vitals: BP (!) 122/90 (BP Location: Left arm, Patient Position: Sitting, BP Cuff Size: Large adult)   Pulse 75   Temp 36.3 °C (97.3 °F) (Temporal)   Resp 18   Ht 1.854 m (6' 1\")   Wt 95.9 kg (211 lb 6.4 oz)   SpO2 99%   BMI 27.89 kg/m²   General: Alert, pleasant, NAD  EYES:   PERRL, EOMI, no icterus or " pallor.  Conjunctivae and lids normal.   HENT:  Normocephalic.  External ears normal.  Neck supple.   Spasming posterior neck more prominent on the right side.  Respiratory: Normal respiratory effort.   Abdomen: Not obese  Skin: Warm, dry, no rashes.  Musculoskeletal: Gait is normal.  Moves all extremities well.    Extremities: normal range of motion all extremities.   Neurological: No tremors, sensation grossly intact, CN2-12 intact.  Psych:  Affect/mood is normal, judgement is good, memory is intact, grooming is appropriate.  6EKG: Normal sinus normal rhythm with no ST elevation or depression.  Similar to reading dated 9-.    Assessment/Plan:     Arcelia was seen today for jaw pain.    Diagnoses and all orders for this visit:    Jaw pain  -     EKG - Clinic Performed  Paresthesia  -     Referral to Physical Therapy  -     DX-CERVICAL SPINE-2 OR 3 VIEWS; Future  -     CBC WITH DIFFERENTIAL; Future  -     Comp Metabolic Panel; Future  -     VITAMIN B12; Future  -     IRON/TOTAL IRON BIND; Future  -     VITAMIN B1; Future  -     VITAMIN B2 (RIBOFLAVIN); Future  -     VITAMIN B6; Future  -     FOLATE; Future  -     TSH WITH REFLEX TO FT4; Future  -     cyclobenzaprine (FLEXERIL) 10 mg Tab; Take 1 Tablet by mouth 2 times a day as needed for Moderate Pain.  -     HEMOGLOBIN A1C; Future  Neck pain  -     Referral to Physical Therapy  -     DX-CERVICAL SPINE-2 OR 3 VIEWS; Future  -     CBC WITH DIFFERENTIAL; Future  -     Comp Metabolic Panel; Future  Acute pain of right shoulder  -     DX-SHOULDER 2+ RIGHT; Future    Possible pinched nerves.  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  He is working 12-hour shifts and work 22 days straight.  This could be contributing to symptoms.  However we will obtain labs to evaluate further as well as x-rays of his neck and shoulder and refer patient to physical therapy.  If patient fails physical therapy, will consider MRI.  Follow-up in 4 to 6 weeks with test  results.    Intractable headache, unspecified chronicity pattern, unspecified headache type  -     Referral to Physical Therapy  -     DX-CERVICAL SPINE-2 OR 3 VIEWS; Future  -     CBC WITH DIFFERENTIAL; Future  -     Comp Metabolic Panel; Future    Possibly related to tension.  Again patient sits with his head leaning forward for approximately 12 hours a day.  We will obtain x-rays and refer to PT.    Hyperglycemia  -     Comp Metabolic Panel; Future  -     HEMOGLOBIN A1C; Future    Obtain labs to evaluate further.    Gastroesophageal reflux disease without esophagitis  -     omeprazole (PRILOSEC) 40 MG delayed-release capsule; Take 1 Capsule by mouth every day.      Could be contributing to cough.  We will start patient on omeprazole.  Follow-up in 4 to 6 weeks.    Overall patient has multiple symptoms which are complex.  Advised patient that it will take time to sort through this to determine what his specific issues/problems may be.  He does have an upcoming appointment with GI next month.      Return in about 4 weeks (around 6/28/2023), or if symptoms worsen or fail to improve, for 4-6 weeks.    Please note that this dictation was created using voice recognition software. I have made every reasonable attempt to correct obvious errors, but expect that there are errors of grammar and possible content that I did not discover before finalizing note.

## 2023-06-01 ENCOUNTER — TELEPHONE (OUTPATIENT)
Dept: HEALTH INFORMATION MANAGEMENT | Facility: OTHER | Age: 53
End: 2023-06-01

## 2023-06-02 ENCOUNTER — HOSPITAL ENCOUNTER (OUTPATIENT)
Dept: LAB | Facility: MEDICAL CENTER | Age: 53
End: 2023-06-02
Attending: PHYSICIAN ASSISTANT
Payer: COMMERCIAL

## 2023-06-02 ENCOUNTER — APPOINTMENT (OUTPATIENT)
Dept: RADIOLOGY | Facility: IMAGING CENTER | Age: 53
End: 2023-06-02
Attending: PHYSICIAN ASSISTANT
Payer: COMMERCIAL

## 2023-06-02 ENCOUNTER — NON-PROVIDER VISIT (OUTPATIENT)
Dept: URGENT CARE | Facility: PHYSICIAN GROUP | Age: 53
End: 2023-06-02
Payer: COMMERCIAL

## 2023-06-02 DIAGNOSIS — M54.2 NECK PAIN: ICD-10-CM

## 2023-06-02 DIAGNOSIS — R20.2 PARESTHESIA: ICD-10-CM

## 2023-06-02 DIAGNOSIS — R73.9 HYPERGLYCEMIA: ICD-10-CM

## 2023-06-02 DIAGNOSIS — M25.511 ACUTE PAIN OF RIGHT SHOULDER: ICD-10-CM

## 2023-06-02 DIAGNOSIS — R51.9 INTRACTABLE HEADACHE, UNSPECIFIED CHRONICITY PATTERN, UNSPECIFIED HEADACHE TYPE: ICD-10-CM

## 2023-06-02 LAB
BASOPHILS # BLD AUTO: 1.1 % (ref 0–1.8)
BASOPHILS # BLD: 0.07 K/UL (ref 0–0.12)
EOSINOPHIL # BLD AUTO: 0.16 K/UL (ref 0–0.51)
EOSINOPHIL NFR BLD: 2.5 % (ref 0–6.9)
ERYTHROCYTE [DISTWIDTH] IN BLOOD BY AUTOMATED COUNT: 38.5 FL (ref 35.9–50)
HCT VFR BLD AUTO: 46 % (ref 42–52)
HGB BLD-MCNC: 15.9 G/DL (ref 14–18)
IMM GRANULOCYTES # BLD AUTO: 0.01 K/UL (ref 0–0.11)
IMM GRANULOCYTES NFR BLD AUTO: 0.2 % (ref 0–0.9)
LYMPHOCYTES # BLD AUTO: 1.78 K/UL (ref 1–4.8)
LYMPHOCYTES NFR BLD: 27.4 % (ref 22–41)
MCH RBC QN AUTO: 30.7 PG (ref 27–33)
MCHC RBC AUTO-ENTMCNC: 34.6 G/DL (ref 32.3–36.5)
MCV RBC AUTO: 88.8 FL (ref 81.4–97.8)
MONOCYTES # BLD AUTO: 0.57 K/UL (ref 0–0.85)
MONOCYTES NFR BLD AUTO: 8.8 % (ref 0–13.4)
NEUTROPHILS # BLD AUTO: 3.9 K/UL (ref 1.82–7.42)
NEUTROPHILS NFR BLD: 60 % (ref 44–72)
NRBC # BLD AUTO: 0 K/UL
NRBC BLD-RTO: 0 /100 WBC (ref 0–0.2)
PLATELET # BLD AUTO: 287 K/UL (ref 164–446)
PMV BLD AUTO: 10.5 FL (ref 9–12.9)
RBC # BLD AUTO: 5.18 M/UL (ref 4.7–6.1)
WBC # BLD AUTO: 6.5 K/UL (ref 4.8–10.8)

## 2023-06-02 PROCEDURE — 73030 X-RAY EXAM OF SHOULDER: CPT | Mod: TC,FY,RT | Performed by: RADIOLOGY

## 2023-06-02 PROCEDURE — 83540 ASSAY OF IRON: CPT

## 2023-06-02 PROCEDURE — 84443 ASSAY THYROID STIM HORMONE: CPT

## 2023-06-02 PROCEDURE — 80053 COMPREHEN METABOLIC PANEL: CPT

## 2023-06-02 PROCEDURE — 83036 HEMOGLOBIN GLYCOSYLATED A1C: CPT

## 2023-06-02 PROCEDURE — 85025 COMPLETE CBC W/AUTO DIFF WBC: CPT

## 2023-06-02 PROCEDURE — 83550 IRON BINDING TEST: CPT

## 2023-06-02 PROCEDURE — 84207 ASSAY OF VITAMIN B-6: CPT

## 2023-06-02 PROCEDURE — 82746 ASSAY OF FOLIC ACID SERUM: CPT

## 2023-06-02 PROCEDURE — 72040 X-RAY EXAM NECK SPINE 2-3 VW: CPT | Mod: TC,FY | Performed by: RADIOLOGY

## 2023-06-02 PROCEDURE — 36415 COLL VENOUS BLD VENIPUNCTURE: CPT

## 2023-06-02 PROCEDURE — 82607 VITAMIN B-12: CPT

## 2023-06-02 PROCEDURE — 84425 ASSAY OF VITAMIN B-1: CPT

## 2023-06-02 PROCEDURE — 84252 ASSAY OF VITAMIN B-2: CPT

## 2023-06-03 LAB
ALBUMIN SERPL BCP-MCNC: 4.4 G/DL (ref 3.2–4.9)
ALBUMIN/GLOB SERPL: 1.7 G/DL
ALP SERPL-CCNC: 121 U/L (ref 30–99)
ALT SERPL-CCNC: 88 U/L (ref 2–50)
ANION GAP SERPL CALC-SCNC: 10 MMOL/L (ref 7–16)
AST SERPL-CCNC: 48 U/L (ref 12–45)
BILIRUB SERPL-MCNC: 0.6 MG/DL (ref 0.1–1.5)
BUN SERPL-MCNC: 14 MG/DL (ref 8–22)
CALCIUM ALBUM COR SERPL-MCNC: 9.3 MG/DL (ref 8.5–10.5)
CALCIUM SERPL-MCNC: 9.6 MG/DL (ref 8.5–10.5)
CHLORIDE SERPL-SCNC: 102 MMOL/L (ref 96–112)
CO2 SERPL-SCNC: 27 MMOL/L (ref 20–33)
CREAT SERPL-MCNC: 0.89 MG/DL (ref 0.5–1.4)
EST. AVERAGE GLUCOSE BLD GHB EST-MCNC: 126 MG/DL
FOLATE SERPL-MCNC: 19.1 NG/ML
GFR SERPLBLD CREATININE-BSD FMLA CKD-EPI: 104 ML/MIN/1.73 M 2
GLOBULIN SER CALC-MCNC: 2.6 G/DL (ref 1.9–3.5)
GLUCOSE SERPL-MCNC: 121 MG/DL (ref 65–99)
HBA1C MFR BLD: 6 % (ref 4–5.6)
IRON SATN MFR SERPL: 50 % (ref 15–55)
IRON SERPL-MCNC: 133 UG/DL (ref 50–180)
POTASSIUM SERPL-SCNC: 4.1 MMOL/L (ref 3.6–5.5)
PROT SERPL-MCNC: 7 G/DL (ref 6–8.2)
SODIUM SERPL-SCNC: 139 MMOL/L (ref 135–145)
TIBC SERPL-MCNC: 265 UG/DL (ref 250–450)
TSH SERPL DL<=0.005 MIU/L-ACNC: 1.63 UIU/ML (ref 0.38–5.33)
UIBC SERPL-MCNC: 132 UG/DL (ref 110–370)
VIT B12 SERPL-MCNC: 880 PG/ML (ref 211–911)

## 2023-06-05 ENCOUNTER — TELEPHONE (OUTPATIENT)
Dept: HEALTH INFORMATION MANAGEMENT | Facility: OTHER | Age: 53
End: 2023-06-05

## 2023-06-06 LAB — VIT B1 BLD-MCNC: 161 NMOL/L (ref 70–180)

## 2023-06-07 LAB
VIT B2 SERPL-SCNC: 10 NMOL/L (ref 5–50)
VIT B6 SERPL-MCNC: 58.1 NMOL/L (ref 20–125)

## 2023-06-13 ENCOUNTER — HOSPITAL ENCOUNTER (OUTPATIENT)
Dept: RADIOLOGY | Facility: MEDICAL CENTER | Age: 53
End: 2023-06-13
Attending: PHYSICIAN ASSISTANT

## 2023-06-13 ENCOUNTER — TELEMEDICINE (OUTPATIENT)
Dept: MEDICAL GROUP | Facility: CLINIC | Age: 53
End: 2023-06-13
Payer: COMMERCIAL

## 2023-06-13 VITALS — BODY MASS INDEX: 27.57 KG/M2 | WEIGHT: 208 LBS | HEIGHT: 73 IN

## 2023-06-13 DIAGNOSIS — R79.89 ELEVATED LFTS: ICD-10-CM

## 2023-06-13 DIAGNOSIS — K62.89 RECTAL PAIN: ICD-10-CM

## 2023-06-13 DIAGNOSIS — M25.511 ACUTE PAIN OF RIGHT SHOULDER: ICD-10-CM

## 2023-06-13 DIAGNOSIS — M54.2 NECK PAIN: ICD-10-CM

## 2023-06-13 DIAGNOSIS — K76.0 FATTY LIVER: ICD-10-CM

## 2023-06-13 DIAGNOSIS — M79.5 RETAINED BULLET: ICD-10-CM

## 2023-06-13 DIAGNOSIS — E78.49 OTHER HYPERLIPIDEMIA: ICD-10-CM

## 2023-06-13 PROBLEM — R73.03 PREDIABETES: Status: ACTIVE | Noted: 2023-03-30

## 2023-06-13 PROCEDURE — 99214 OFFICE O/P EST MOD 30 MIN: CPT | Mod: 95 | Performed by: PHYSICIAN ASSISTANT

## 2023-06-13 RX ORDER — COLLAGENASE CLOSTRIDIUM HISTOLYTICUM 0.9 MG
KIT INJECTION
COMMUNITY
Start: 2023-05-31

## 2023-06-13 ASSESSMENT — FIBROSIS 4 INDEX: FIB4 SCORE: 0.91
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
## 2023-08-15 ENCOUNTER — APPOINTMENT (OUTPATIENT)
Dept: RADIOLOGY | Facility: MEDICAL CENTER | Age: 53
End: 2023-08-15
Attending: STUDENT IN AN ORGANIZED HEALTH CARE EDUCATION/TRAINING PROGRAM

## 2023-08-15 ENCOUNTER — HOSPITAL ENCOUNTER (EMERGENCY)
Facility: MEDICAL CENTER | Age: 53
End: 2023-08-15
Attending: STUDENT IN AN ORGANIZED HEALTH CARE EDUCATION/TRAINING PROGRAM
Payer: MEDICAID

## 2023-08-15 VITALS
WEIGHT: 194.67 LBS | HEART RATE: 78 BPM | DIASTOLIC BLOOD PRESSURE: 84 MMHG | OXYGEN SATURATION: 94 % | TEMPERATURE: 98 F | BODY MASS INDEX: 25.68 KG/M2 | RESPIRATION RATE: 15 BRPM | SYSTOLIC BLOOD PRESSURE: 128 MMHG

## 2023-08-15 DIAGNOSIS — F15.929 METHAMPHETAMINE INTOXICATION (HCC): ICD-10-CM

## 2023-08-15 DIAGNOSIS — R07.9 CHEST PAIN, UNSPECIFIED TYPE: ICD-10-CM

## 2023-08-15 LAB
ALBUMIN SERPL BCP-MCNC: 4.6 G/DL (ref 3.2–4.9)
ALBUMIN/GLOB SERPL: 2 G/DL
ALP SERPL-CCNC: 92 U/L (ref 30–99)
ALT SERPL-CCNC: 20 U/L (ref 2–50)
ANION GAP SERPL CALC-SCNC: 10 MMOL/L (ref 7–16)
AST SERPL-CCNC: 23 U/L (ref 12–45)
BASOPHILS # BLD AUTO: 0.6 % (ref 0–1.8)
BASOPHILS # BLD: 0.06 K/UL (ref 0–0.12)
BILIRUB SERPL-MCNC: 0.4 MG/DL (ref 0.1–1.5)
BUN SERPL-MCNC: 14 MG/DL (ref 8–22)
CALCIUM ALBUM COR SERPL-MCNC: 8.9 MG/DL (ref 8.5–10.5)
CALCIUM SERPL-MCNC: 9.4 MG/DL (ref 8.5–10.5)
CHLORIDE SERPL-SCNC: 105 MMOL/L (ref 96–112)
CO2 SERPL-SCNC: 25 MMOL/L (ref 20–33)
CREAT SERPL-MCNC: 0.98 MG/DL (ref 0.5–1.4)
EKG IMPRESSION: NORMAL
EOSINOPHIL # BLD AUTO: 0.12 K/UL (ref 0–0.51)
EOSINOPHIL NFR BLD: 1.3 % (ref 0–6.9)
ERYTHROCYTE [DISTWIDTH] IN BLOOD BY AUTOMATED COUNT: 41.1 FL (ref 35.9–50)
GFR SERPLBLD CREATININE-BSD FMLA CKD-EPI: 93 ML/MIN/1.73 M 2
GLOBULIN SER CALC-MCNC: 2.3 G/DL (ref 1.9–3.5)
GLUCOSE SERPL-MCNC: 130 MG/DL (ref 65–99)
HCT VFR BLD AUTO: 44.3 % (ref 42–52)
HGB BLD-MCNC: 14.9 G/DL (ref 14–18)
HIV 1+2 AB+HIV1 P24 AG SERPL QL IA: NORMAL
IMM GRANULOCYTES # BLD AUTO: 0.03 K/UL (ref 0–0.11)
IMM GRANULOCYTES NFR BLD AUTO: 0.3 % (ref 0–0.9)
LIPASE SERPL-CCNC: 24 U/L (ref 11–82)
LYMPHOCYTES # BLD AUTO: 1.44 K/UL (ref 1–4.8)
LYMPHOCYTES NFR BLD: 15.2 % (ref 22–41)
MCH RBC QN AUTO: 30.8 PG (ref 27–33)
MCHC RBC AUTO-ENTMCNC: 33.6 G/DL (ref 32.3–36.5)
MCV RBC AUTO: 91.7 FL (ref 81.4–97.8)
MONOCYTES # BLD AUTO: 0.54 K/UL (ref 0–0.85)
MONOCYTES NFR BLD AUTO: 5.7 % (ref 0–13.4)
NEUTROPHILS # BLD AUTO: 7.27 K/UL (ref 1.82–7.42)
NEUTROPHILS NFR BLD: 76.9 % (ref 44–72)
NRBC # BLD AUTO: 0 K/UL
NRBC BLD-RTO: 0 /100 WBC (ref 0–0.2)
PLATELET # BLD AUTO: 269 K/UL (ref 164–446)
PMV BLD AUTO: 10.3 FL (ref 9–12.9)
POTASSIUM SERPL-SCNC: 3.7 MMOL/L (ref 3.6–5.5)
PROT SERPL-MCNC: 6.9 G/DL (ref 6–8.2)
RBC # BLD AUTO: 4.83 M/UL (ref 4.7–6.1)
SODIUM SERPL-SCNC: 140 MMOL/L (ref 135–145)
T PALLIDUM AB SER QL IA: NORMAL
TROPONIN T SERPL-MCNC: 8 NG/L (ref 6–19)
TROPONIN T SERPL-MCNC: 8 NG/L (ref 6–19)
WBC # BLD AUTO: 9.5 K/UL (ref 4.8–10.8)

## 2023-08-15 PROCEDURE — 71045 X-RAY EXAM CHEST 1 VIEW: CPT

## 2023-08-15 PROCEDURE — 99285 EMERGENCY DEPT VISIT HI MDM: CPT

## 2023-08-15 PROCEDURE — 87389 HIV-1 AG W/HIV-1&-2 AB AG IA: CPT

## 2023-08-15 PROCEDURE — 700102 HCHG RX REV CODE 250 W/ 637 OVERRIDE(OP): Performed by: STUDENT IN AN ORGANIZED HEALTH CARE EDUCATION/TRAINING PROGRAM

## 2023-08-15 PROCEDURE — 84484 ASSAY OF TROPONIN QUANT: CPT

## 2023-08-15 PROCEDURE — A9270 NON-COVERED ITEM OR SERVICE: HCPCS | Performed by: STUDENT IN AN ORGANIZED HEALTH CARE EDUCATION/TRAINING PROGRAM

## 2023-08-15 PROCEDURE — 36415 COLL VENOUS BLD VENIPUNCTURE: CPT

## 2023-08-15 PROCEDURE — 83690 ASSAY OF LIPASE: CPT

## 2023-08-15 PROCEDURE — 93005 ELECTROCARDIOGRAM TRACING: CPT | Performed by: STUDENT IN AN ORGANIZED HEALTH CARE EDUCATION/TRAINING PROGRAM

## 2023-08-15 PROCEDURE — 85025 COMPLETE CBC W/AUTO DIFF WBC: CPT

## 2023-08-15 PROCEDURE — 80053 COMPREHEN METABOLIC PANEL: CPT

## 2023-08-15 PROCEDURE — 93005 ELECTROCARDIOGRAM TRACING: CPT

## 2023-08-15 PROCEDURE — 86780 TREPONEMA PALLIDUM: CPT

## 2023-08-15 RX ORDER — ASPIRIN 81 MG/1
324 TABLET, CHEWABLE ORAL ONCE
Status: COMPLETED | OUTPATIENT
Start: 2023-08-15 | End: 2023-08-15

## 2023-08-15 RX ORDER — LORAZEPAM 1 MG/1
1 TABLET ORAL ONCE
Status: COMPLETED | OUTPATIENT
Start: 2023-08-15 | End: 2023-08-15

## 2023-08-15 RX ADMIN — ASPIRIN 81 MG 324 MG: 81 TABLET ORAL at 18:26

## 2023-08-15 RX ADMIN — LORAZEPAM 1 MG: 1 TABLET ORAL at 19:18

## 2023-08-15 ASSESSMENT — FIBROSIS 4 INDEX: FIB4 SCORE: 0.91

## 2023-08-15 NOTE — ED TRIAGE NOTES
Chief Complaint   Patient presents with    Detox     Hx meth. Sober for years, recently relapsed.     Chest Pain     X 1 wk. Abd pain.         BP (!) 147/104   Pulse 97   Temp 36.6 °C (97.8 °F) (Temporal)   Resp 16   SpO2 98%

## 2023-08-16 NOTE — ED NOTES
Bedside report from GERRI Stack. Pt resting in [facility] comfortably, on monitor, call light in reach. Family at bedside

## 2023-08-16 NOTE — ED PROVIDER NOTES
ED Provider Note    CHIEF COMPLAINT  Chief Complaint   Patient presents with    Detox     Hx meth. Sober for years, recently relapsed.     Chest Pain     X 1 wk. Abd pain.        EXTERNAL RECORDS REVIEWED  Outpatient Notes office visit on 5/31/2023 for jaw pain    HPI/ROS  LIMITATION TO HISTORY   Select: : None  OUTSIDE HISTORIAN(S):  Significant other reports patiently recently smoked methamphetamine    Arcelia Umanzor is a 53 y.o. male who presents with chest pain following methamphetamine use.  Patient recently relapsed.  Patient reports substernal chest pain which is nonradiating.  Patient reports that it has improved since being in the emergency department.  Patient denies exertional or pleuritic chest pain.  Patient denies lightheadedness, diaphoresis.    PAST MEDICAL HISTORY   has a past medical history of Anxiety, Blood transfusion, without reported diagnosis, Headache(784.0), Headache, classical migraine, IBS (irritable bowel syndrome) (6/10/2016), Meningitis (2010), and Substance abuse (HCC).    SURGICAL HISTORY   has a past surgical history that includes abdominal exploration (1992).    FAMILY HISTORY  Family History   Problem Relation Age of Onset    Lung Disease Mother     Heart Disease Mother     Alcohol/Drug Mother     Heart Disease Father     Heart Disease Sister         stents    Heart Disease Sister         stents       SOCIAL HISTORY  Social History     Tobacco Use    Smoking status: Never    Smokeless tobacco: Current     Types: Chew    Tobacco comments:     Nicotine pouches   Vaping Use    Vaping Use: Former    Substances: Nicotine, Flavoring    Devices: Pre-filled pod   Substance and Sexual Activity    Alcohol use: No     Alcohol/week: 0.0 oz    Drug use: Not Currently    Sexual activity: Not on file       CURRENT MEDICATIONS  Home Medications    **Home medications have not yet been reviewed for this encounter**         ALLERGIES  No Known Allergies    PHYSICAL EXAM  VITAL SIGNS: /84    Pulse 78   Temp 36.7 °C (98 °F) (Temporal)   Resp 15   Wt 88.3 kg (194 lb 10.7 oz)   SpO2 94%   BMI 25.68 kg/m²    Vitals and nursing note reviewed.   Constitutional:       Comments: Patient is lying in bed supine, pleasant, conversant, speaking in complete sentences   HENT:      Head: Normocephalic and atraumatic.   Eyes:      Extraocular Movements: Extraocular movements intact.      Conjunctiva/sclera: Conjunctivae normal.      Pupils: Pupils are equal, round, and reactive to light.   Cardiovascular:      Pulses: Normal pulses.      Comments: HR 72  Pulmonary:      Effort: Pulmonary effort is normal. No respiratory distress.   Abdominal:      Comments: Abdomen is soft, non-tender, non-distended, non-rigid, no rebound, guarding, masses, no McBurney's point tenderness, no peritoneal signs, negative Rovsing sign, negative Watkins sign.  No CVA tenderness to palpation. Benign abdomen.   Musculoskeletal:         General: No swelling. Normal range of motion.      Cervical back: Normal range of motion. No rigidity.   Skin:     General: Skin is warm and dry.      Capillary Refill: Capillary refill takes less than 2 seconds.   Neurological:      Mental Status: Alert.       DIAGNOSTIC STUDIES / PROCEDURES  EKG  I have independently interpreted this EKG  Mild ST elevations without reciprocal changes    LABS  Troponin negative x2    RADIOLOGY  I have independently interpreted the diagnostic imaging associated with this visit and am waiting the final reading from the radiologist.   My preliminary interpretation is as follows: No pneumonia, pneumothorax, pulmonary edema  Radiologist interpretation: No pneumonia, pneumothorax, pulmonary edema    COURSE & MEDICAL DECISION MAKING      INITIAL ASSESSMENT, COURSE AND PLAN  Care Narrative: Initial troponin negative, EKG demonstrates mild upsloping ST elevations without reciprocal changes. CMP demonstrates no evidence of acute kidney injury, acute electrolyte abnormality, acute  liver failure, CBC demonstrates no evidence of acute anemia or leukocytosis.  Lipase negative, pancreatitis inconsistent with patient presentation at this time.  Patient reports feeling better after being in the emergency department.  Patient has family history of cardiac disease.  Initial opponent negative thankfully.  We will repeat troponin to rule out ACS.  Ativan given for symptoms of anxiety following methamphetamine use.    Electronically signed by: Manoj Blue M.D., 8/15/2023 7:19 PM    Troponin negative x2, ACS inconsistent with patient presentation at this time.  EKG changes likely benign early repolarization.  Patient counseled to follow-up PCP and follow-up with addiction services as well.  Patient requesting new home at this time which I feel is appropriate.  Patient has met with social work.  Patient is in the care of a friend.    Repeat physical exam benign.  I doubt any serious emergency process at this time.  Patient and/or family, friends given strict return precautions for worsening symptoms and care instructions. They have demonstrated understanding of discharge instructions through teach back mechanism. Advised PCP follow-up in 1-2 days.  Patient/family/friend expresses understanding and agrees to plan.    This dictation has been created using voice recognition software. I am continuously working with the software to minimize the number of voice recognition errors and I have made every attempt to manually correct the errors within my dictation. However errors  related to this voice recognition software may still exist and should be interpreted within the appropriate context.     Electronically signed by: Manoj Blue M.D., 8/15/2023 10:04 PM      DISPOSITION AND DISCUSSIONS    Discussion of management with other [facility] or appropriate source(s): Social work GERRI Gray    Escalation of care considered, and ultimately not performed:after discussion with the patient / family, they have  elected to decline an escalation in care      Decision tools and prescription drugs considered including, but not limited to: HEART Score 3 .    FINAL DIAGNOSIS  1. Chest pain, unspecified type    2. Methamphetamine intoxication (HCC)           Electronically signed by: Manoj Blue M.D., 8/15/2023 7:16 PM

## 2023-10-05 ENCOUNTER — APPOINTMENT (OUTPATIENT)
Dept: URGENT CARE | Facility: PHYSICIAN GROUP | Age: 53
End: 2023-10-05
Payer: MEDICAID

## 2023-11-12 ENCOUNTER — HOSPITAL ENCOUNTER (EMERGENCY)
Facility: MEDICAL CENTER | Age: 53
End: 2023-11-12
Attending: EMERGENCY MEDICINE
Payer: MEDICAID

## 2023-11-12 VITALS
SYSTOLIC BLOOD PRESSURE: 163 MMHG | RESPIRATION RATE: 18 BRPM | HEIGHT: 74 IN | HEART RATE: 93 BPM | TEMPERATURE: 97 F | OXYGEN SATURATION: 96 % | WEIGHT: 185 LBS | BODY MASS INDEX: 23.74 KG/M2 | DIASTOLIC BLOOD PRESSURE: 93 MMHG

## 2023-11-12 DIAGNOSIS — J06.9 VIRAL URI WITH COUGH: ICD-10-CM

## 2023-11-12 PROCEDURE — 99284 EMERGENCY DEPT VISIT MOD MDM: CPT

## 2023-11-12 PROCEDURE — 99406 BEHAV CHNG SMOKING 3-10 MIN: CPT

## 2023-11-12 ASSESSMENT — FIBROSIS 4 INDEX: FIB4 SCORE: 0.98

## 2023-11-12 NOTE — ED PROVIDER NOTES
"ED Provider Note    CHIEF COMPLAINT  Chief Complaint   Patient presents with    Shortness of Breath     Pt bib ems from a [employer], pt was taken into police custody when he had onset of shortness of breath and difficulty breathing    Cough     Pt states he has had a cough for 1 day and feels like his lungs \"are full\"       EXTERNAL RECORDS REVIEWED  Other reviewed prior records and the patient presented for detox from meth and also reviewed a chest x-ray from August 15 that shows no evidence of pneumonia    HPI/ROS    Arcelia Umanzor is a 53 y.o. male who presents stating does not feel well.  The patient admits to abusing methamphetamines, marijuana, and tobacco products.  He states that he had a cough and feels like his lungs are full.  He has not had any associated fevers.  He has not had any vomiting or diarrhea.  He states he feels chilled but has not had any fevers.    PAST MEDICAL HISTORY   has a past medical history of Anxiety, Blood transfusion, without reported diagnosis, Headache(784.0), Headache, classical migraine, IBS (irritable bowel syndrome) (6/10/2016), Meningitis (2010), and Substance abuse (HCC).    SURGICAL HISTORY   has a past surgical history that includes abdominal exploration (1992).    FAMILY HISTORY  Family History   Problem Relation Age of Onset    Lung Disease Mother     Heart Disease Mother     Alcohol/Drug Mother     Heart Disease Father     Heart Disease Sister         stents    Heart Disease Sister         stents       SOCIAL HISTORY  Social History     Tobacco Use    Smoking status: Never    Smokeless tobacco: Current     Types: Chew    Tobacco comments:     Nicotine pouches   Vaping Use    Vaping Use: Former    Substances: Nicotine, Flavoring    Devices: Pre-filled pod   Substance and Sexual Activity    Alcohol use: No     Alcohol/week: 0.0 oz    Drug use: Yes     Comment: meth, marijuana    Sexual activity: Not on file       CURRENT MEDICATIONS  Home Medications  " "  **Home medications have not yet been reviewed for this encounter**         ALLERGIES  No Known Allergies    PHYSICAL EXAM  VITAL SIGNS: BP (!) 178/91   Pulse 94   Temp 36 °C (96.8 °F) (Oral)   Resp 19   Ht 1.88 m (6' 2\")   Wt 83.9 kg (185 lb)   SpO2 98%   BMI 23.75 kg/m²    In general the patient is slightly unkempt but does not appear in acute distress    Ears tympanic membranes retracted bilaterally, nares have swollen turbinates, oropharynx nonerythematous    Pulmonary the patient's lungs are clear to auscultation bilaterally with no wheezing, rhonchi, no rales    Cardiovascular S1-S2 with a regular rate and rhythm    GI abdomen soft    Skin no rashes, pallor, no jaundice    Extremities no edema    Neurologic examination is grossly intact      COURSE & MEDICAL DECISION MAKING    This a 53-year-old gentleman who presents the emergency department with signs and symptoms consistent with a viral upper respiratory infection.  I suspect his immune system is diminished secondary to his polysubstance abuse.  Clinically the patient does not appear toxic nor is he hypoxic.  Therefore the patient be treated supportively with anti-inflammatories, nasal hydration, and oral hydration.  I had a long discussion with the patient regarding the need to stop drug abuse as well as tobacco abuse.  The patient will be discharged in the medically cleared as he will be incarcerated.    FINAL DIAGNOSIS  1.  Viral upper respiratory infection  2.  Polysubstance abuse  3.  Rachid Jaquez M.D. spent greater than 3 minutes with the patient explaining the importance of smoking cessation.    4.  The patient is medically cleared for incarceration    Disposition  The patient will be discharged in stable condition       Electronically signed by: Rachid Jaquez M.D., 11/12/2023 4:06 AM      "

## 2023-11-12 NOTE — ED TRIAGE NOTES
"Chief Complaint   Patient presents with    Shortness of Breath     Pt bib ems from a [place] gas station, pt was taken into police custody when he had onset of shortness of breath and difficulty breathing    Cough     Pt states he has had a cough for 1 day and feels like his lungs \"are full\"     Pt accompanied by police to [facility], connected to monitor, VS stable, NAD  "

## 2023-11-12 NOTE — DISCHARGE INSTRUCTIONS
Utilize nasal hydration as discussed.  Refrain from further substance abuse.  Stay well-hydrated.  Take Motrin and Tylenol as needed for pain control    The patient is medically cleared for incarceration

## 2023-11-12 NOTE — ED NOTES
PT became agitated after being given DC instructions, security called to assist police in transporting pt out of the ER

## 2024-03-27 NOTE — PROGRESS NOTES
Please see below for the contact information for gastroenterology.  You will need to contact them to set up an appointment.  When you feel the protonix is not working?  Is it because there is still pain? Is it more the noises you are concerned about?     Yes H pylori can cause ulcers but we do not simply start treatment without testing.  I have ordered h pylori testing for you.  If this is positive then yes, we can treat.      [facility]  [address] [place] [address] [place]. [address]  Phone: [phone]  Fax: [phone]  
Patient

## 2024-04-13 ENCOUNTER — OFFICE VISIT (OUTPATIENT)
Dept: URGENT CARE | Facility: PHYSICIAN GROUP | Age: 54
End: 2024-04-13
Payer: MEDICAID

## 2024-04-13 VITALS
BODY MASS INDEX: 24.38 KG/M2 | WEIGHT: 190 LBS | RESPIRATION RATE: 14 BRPM | TEMPERATURE: 98.8 F | OXYGEN SATURATION: 98 % | DIASTOLIC BLOOD PRESSURE: 82 MMHG | SYSTOLIC BLOOD PRESSURE: 130 MMHG | HEART RATE: 74 BPM | HEIGHT: 74 IN

## 2024-04-13 DIAGNOSIS — R40.20 LOC (LOSS OF CONSCIOUSNESS) (HCC): ICD-10-CM

## 2024-04-13 DIAGNOSIS — S09.90XA CLOSED HEAD INJURY, INITIAL ENCOUNTER: ICD-10-CM

## 2024-04-13 DIAGNOSIS — R55 SYNCOPE, UNSPECIFIED SYNCOPE TYPE: ICD-10-CM

## 2024-04-13 PROCEDURE — 3075F SYST BP GE 130 - 139MM HG: CPT

## 2024-04-13 PROCEDURE — 99215 OFFICE O/P EST HI 40 MIN: CPT

## 2024-04-13 PROCEDURE — 3079F DIAST BP 80-89 MM HG: CPT

## 2024-04-13 ASSESSMENT — FIBROSIS 4 INDEX: FIB4 SCORE: 0.99
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
## 2025-03-21 ENCOUNTER — NON-PROVIDER VISIT (OUTPATIENT)
Dept: URGENT CARE | Facility: PHYSICIAN GROUP | Age: 55
End: 2025-03-21

## 2025-03-21 DIAGNOSIS — Z02.1 PRE-EMPLOYMENT DRUG SCREENING: ICD-10-CM

## 2025-03-21 DIAGNOSIS — Z02.83 ENCOUNTER FOR DRUG SCREENING: ICD-10-CM

## 2025-03-21 LAB
AMP AMPHETAMINE: NORMAL
COC COCAINE: NORMAL
INT CON NEG: NORMAL
INT CON POS: NORMAL
MET METHAMPHETAMINES: NORMAL
OPI OPIATES: NORMAL
PCP PHENCYCLIDINE: NORMAL
POC DRUG COMMENT 753798-OCCUPATIONAL HEALTH: NORMAL
THC: NORMAL

## 2025-03-21 PROCEDURE — 80305 DRUG TEST PRSMV DIR OPT OBS: CPT | Performed by: FAMILY MEDICINE
